# Patient Record
Sex: MALE | Race: WHITE | Employment: FULL TIME | URBAN - METROPOLITAN AREA
[De-identification: names, ages, dates, MRNs, and addresses within clinical notes are randomized per-mention and may not be internally consistent; named-entity substitution may affect disease eponyms.]

---

## 2019-07-13 ENCOUNTER — TRANSFERRED RECORDS (OUTPATIENT)
Dept: HEALTH INFORMATION MANAGEMENT | Facility: CLINIC | Age: 29
End: 2019-07-13

## 2019-07-14 LAB — HBA1C MFR BLD: 5.5 % (ref 4.8–5.6)

## 2019-07-15 ENCOUNTER — APPOINTMENT (OUTPATIENT)
Dept: GENERAL RADIOLOGY | Facility: CLINIC | Age: 29
End: 2019-07-15
Attending: STUDENT IN AN ORGANIZED HEALTH CARE EDUCATION/TRAINING PROGRAM
Payer: COMMERCIAL

## 2019-07-15 ENCOUNTER — HOSPITAL ENCOUNTER (INPATIENT)
Facility: CLINIC | Age: 29
LOS: 3 days | Discharge: HOME | End: 2019-07-18
Attending: INTERNAL MEDICINE | Admitting: PEDIATRICS
Payer: COMMERCIAL

## 2019-07-15 ENCOUNTER — TRANSFERRED RECORDS (OUTPATIENT)
Dept: HEALTH INFORMATION MANAGEMENT | Facility: CLINIC | Age: 29
End: 2019-07-15

## 2019-07-15 DIAGNOSIS — F33.2 SEVERE EPISODE OF RECURRENT MAJOR DEPRESSIVE DISORDER, WITHOUT PSYCHOTIC FEATURES (H): ICD-10-CM

## 2019-07-15 DIAGNOSIS — T39.1X2A TYLENOL OVERDOSE, INTENTIONAL SELF-HARM, INITIAL ENCOUNTER (H): Primary | ICD-10-CM

## 2019-07-15 LAB
ALBUMIN SERPL-MCNC: 3 G/DL (ref 3.4–5)
ALP SERPL-CCNC: 75 U/L (ref 40–150)
ALT SERPL W P-5'-P-CCNC: 4879 U/L (ref 0–70)
ALT SERPL-CCNC: >3500 U/L (ref 14–63)
AMMONIA PLAS-SCNC: 57 UMOL/L (ref 10–50)
ANION GAP SERPL CALCULATED.3IONS-SCNC: 8 MMOL/L (ref 3–14)
AST SERPL W P-5'-P-CCNC: 2441 U/L (ref 0–45)
AST SERPL-CCNC: >2000 U/L (ref 15–37)
BASOPHILS # BLD AUTO: 0 10E9/L (ref 0–0.2)
BASOPHILS NFR BLD AUTO: 0.6 %
BILIRUB DIRECT SERPL-MCNC: 1.4 MG/DL (ref 0–0.2)
BILIRUB SERPL-MCNC: 4.2 MG/DL (ref 0.2–1.3)
BUN SERPL-MCNC: 9 MG/DL (ref 7–30)
CALCIUM SERPL-MCNC: 7.7 MG/DL (ref 8.5–10.1)
CHLORIDE SERPL-SCNC: 104 MMOL/L (ref 94–109)
CO2 SERPL-SCNC: 26 MMOL/L (ref 20–32)
CREAT SERPL-MCNC: 0.78 MG/DL (ref 0.66–1.25)
CREAT SERPL-MCNC: 0.83 MG/DL (ref 0.67–1.17)
DIFFERENTIAL METHOD BLD: ABNORMAL
EOSINOPHIL # BLD AUTO: 0.1 10E9/L (ref 0–0.7)
EOSINOPHIL NFR BLD AUTO: 1.3 %
ERYTHROCYTE [DISTWIDTH] IN BLOOD BY AUTOMATED COUNT: 13.1 % (ref 10–15)
GFR SERPL CREATININE-BSD FRML MDRD: >60 ML/MIN/1.73M2 (ref 60–150)
GFR SERPL CREATININE-BSD FRML MDRD: >90 ML/MIN/{1.73_M2}
GLUCOSE SERPL-MCNC: 88 MG/DL (ref 70–99)
GLUCOSE SERPL-MCNC: 95 MG/DL (ref 74–100)
HCT VFR BLD AUTO: 46.9 % (ref 40–53)
HGB BLD-MCNC: 14.9 G/DL (ref 13.3–17.7)
IMM GRANULOCYTES # BLD: 0 10E9/L (ref 0–0.4)
IMM GRANULOCYTES NFR BLD: 0.2 %
INR PPP: 2.23 (ref 0.86–1.14)
INR PPP: 2.8 (ref 0.9–1.1)
LACTATE BLD-SCNC: 1.7 MMOL/L (ref 0.7–2)
LYMPHOCYTES # BLD AUTO: 0.7 10E9/L (ref 0.8–5.3)
LYMPHOCYTES NFR BLD AUTO: 14 %
MCH RBC QN AUTO: 28.9 PG (ref 26.5–33)
MCHC RBC AUTO-ENTMCNC: 31.8 G/DL (ref 31.5–36.5)
MCV RBC AUTO: 91 FL (ref 78–100)
MONOCYTES # BLD AUTO: 0.3 10E9/L (ref 0–1.3)
MONOCYTES NFR BLD AUTO: 5.4 %
NEUTROPHILS # BLD AUTO: 3.8 10E9/L (ref 1.6–8.3)
NEUTROPHILS NFR BLD AUTO: 78.5 %
NRBC # BLD AUTO: 0 10*3/UL
NRBC BLD AUTO-RTO: 0 /100
PHOSPHATE SERPL-MCNC: 1.3 MG/DL (ref 2.5–4.5)
PLATELET # BLD AUTO: 99 10E9/L (ref 150–450)
POTASSIUM SERPL-SCNC: 3.5 MMOL/L (ref 3.4–5.3)
POTASSIUM SERPL-SCNC: 3.6 MMOL/L (ref 3.5–5.1)
PROT SERPL-MCNC: 5.8 G/DL (ref 6.8–8.8)
RBC # BLD AUTO: 5.15 10E12/L (ref 4.4–5.9)
SODIUM SERPL-SCNC: 138 MMOL/L (ref 133–144)
WBC # BLD AUTO: 4.8 10E9/L (ref 4–11)

## 2019-07-15 PROCEDURE — 80076 HEPATIC FUNCTION PANEL: CPT | Performed by: STUDENT IN AN ORGANIZED HEALTH CARE EDUCATION/TRAINING PROGRAM

## 2019-07-15 PROCEDURE — 87077 CULTURE AEROBIC IDENTIFY: CPT | Performed by: STUDENT IN AN ORGANIZED HEALTH CARE EDUCATION/TRAINING PROGRAM

## 2019-07-15 PROCEDURE — 85025 COMPLETE CBC W/AUTO DIFF WBC: CPT | Performed by: STUDENT IN AN ORGANIZED HEALTH CARE EDUCATION/TRAINING PROGRAM

## 2019-07-15 PROCEDURE — 40000556 ZZH STATISTIC PERIPHERAL IV START W US GUIDANCE

## 2019-07-15 PROCEDURE — 87800 DETECT AGNT MULT DNA DIREC: CPT | Performed by: STUDENT IN AN ORGANIZED HEALTH CARE EDUCATION/TRAINING PROGRAM

## 2019-07-15 PROCEDURE — 85610 PROTHROMBIN TIME: CPT | Performed by: STUDENT IN AN ORGANIZED HEALTH CARE EDUCATION/TRAINING PROGRAM

## 2019-07-15 PROCEDURE — 87040 BLOOD CULTURE FOR BACTERIA: CPT | Performed by: STUDENT IN AN ORGANIZED HEALTH CARE EDUCATION/TRAINING PROGRAM

## 2019-07-15 PROCEDURE — 87186 SC STD MICRODIL/AGAR DIL: CPT | Performed by: STUDENT IN AN ORGANIZED HEALTH CARE EDUCATION/TRAINING PROGRAM

## 2019-07-15 PROCEDURE — 25800030 ZZH RX IP 258 OP 636: Performed by: STUDENT IN AN ORGANIZED HEALTH CARE EDUCATION/TRAINING PROGRAM

## 2019-07-15 PROCEDURE — 99223 1ST HOSP IP/OBS HIGH 75: CPT | Mod: AI | Performed by: PEDIATRICS

## 2019-07-15 PROCEDURE — 81001 URINALYSIS AUTO W/SCOPE: CPT | Performed by: STUDENT IN AN ORGANIZED HEALTH CARE EDUCATION/TRAINING PROGRAM

## 2019-07-15 PROCEDURE — 82140 ASSAY OF AMMONIA: CPT | Performed by: STUDENT IN AN ORGANIZED HEALTH CARE EDUCATION/TRAINING PROGRAM

## 2019-07-15 PROCEDURE — 36415 COLL VENOUS BLD VENIPUNCTURE: CPT | Performed by: STUDENT IN AN ORGANIZED HEALTH CARE EDUCATION/TRAINING PROGRAM

## 2019-07-15 PROCEDURE — 80048 BASIC METABOLIC PNL TOTAL CA: CPT | Performed by: STUDENT IN AN ORGANIZED HEALTH CARE EDUCATION/TRAINING PROGRAM

## 2019-07-15 PROCEDURE — 83605 ASSAY OF LACTIC ACID: CPT | Performed by: STUDENT IN AN ORGANIZED HEALTH CARE EDUCATION/TRAINING PROGRAM

## 2019-07-15 PROCEDURE — 25000128 H RX IP 250 OP 636: Performed by: STUDENT IN AN ORGANIZED HEALTH CARE EDUCATION/TRAINING PROGRAM

## 2019-07-15 PROCEDURE — 71045 X-RAY EXAM CHEST 1 VIEW: CPT

## 2019-07-15 PROCEDURE — 84100 ASSAY OF PHOSPHORUS: CPT | Performed by: STUDENT IN AN ORGANIZED HEALTH CARE EDUCATION/TRAINING PROGRAM

## 2019-07-15 PROCEDURE — 12000004 ZZH R&B IMCU UMMC

## 2019-07-15 RX ORDER — LIDOCAINE 40 MG/G
CREAM TOPICAL
Status: DISCONTINUED | OUTPATIENT
Start: 2019-07-15 | End: 2019-07-18 | Stop reason: HOSPADM

## 2019-07-15 RX ORDER — ONDANSETRON 4 MG/1
4 TABLET, ORALLY DISINTEGRATING ORAL EVERY 6 HOURS PRN
Status: DISCONTINUED | OUTPATIENT
Start: 2019-07-15 | End: 2019-07-18 | Stop reason: HOSPADM

## 2019-07-15 RX ORDER — ONDANSETRON 2 MG/ML
4 INJECTION INTRAMUSCULAR; INTRAVENOUS EVERY 6 HOURS PRN
Status: DISCONTINUED | OUTPATIENT
Start: 2019-07-15 | End: 2019-07-18 | Stop reason: HOSPADM

## 2019-07-15 RX ORDER — PANTOPRAZOLE SODIUM 40 MG/1
40 TABLET, DELAYED RELEASE ORAL
Status: DISCONTINUED | OUTPATIENT
Start: 2019-07-16 | End: 2019-07-18 | Stop reason: HOSPADM

## 2019-07-15 RX ORDER — NALOXONE HYDROCHLORIDE 0.4 MG/ML
.1-.4 INJECTION, SOLUTION INTRAMUSCULAR; INTRAVENOUS; SUBCUTANEOUS
Status: DISCONTINUED | OUTPATIENT
Start: 2019-07-15 | End: 2019-07-18 | Stop reason: HOSPADM

## 2019-07-15 RX ADMIN — ACETYLCYSTEINE 6.25 MG/KG/HR: 200 INJECTION, SOLUTION INTRAVENOUS at 20:23

## 2019-07-15 ASSESSMENT — ACTIVITIES OF DAILY LIVING (ADL)
BATHING: 1-->ASSISTIVE EQUIPMENT
RETIRED_EATING: 0-->INDEPENDENT
RETIRED_COMMUNICATION: 0-->UNDERSTANDS/COMMUNICATES WITHOUT DIFFICULTY
COGNITION: 0 - NO COGNITION ISSUES REPORTED
TRANSFERRING: 0-->INDEPENDENT
TOILETING: 0-->INDEPENDENT
SWALLOWING: 0-->SWALLOWS FOODS/LIQUIDS WITHOUT DIFFICULTY
ADLS_ACUITY_SCORE: 19
FALL_HISTORY_WITHIN_LAST_SIX_MONTHS: NO
DRESS: 0-->INDEPENDENT
WHICH_OF_THE_ABOVE_FUNCTIONAL_RISKS_HAD_A_RECENT_ONSET_OR_CHANGE?: AMBULATION
AMBULATION: 1-->ASSISTIVE EQUIPMENT

## 2019-07-15 ASSESSMENT — MIFFLIN-ST. JEOR: SCORE: 2725.34

## 2019-07-15 NOTE — H&P
[facility]    History and Physical - [facility] Night Service        Date of Admission:  7/15/2019    Assessment & Plan   Tree Andersen is a 28 year old male admitted on 7/15/2019. He has a past medical history significant for major depressive disorder presenting here as a transfer from [facility] for further evaluation and management of an acetaminophen overdose.     #Acetaminophen overdose  #Acute liver injury  Took approximately 60 tablets of acetaminophen around 0300 on 7/13. Upon presentation at OSH: , , INR 1.2, acetaminophen level 50. Began mucomyst protocol and received 5mg IV vitamin K. ALT increased to >3500, AST to >2000, INR to 2.8, acetaminophen level <2 after 24 hours. Admission labs here at [facility] notable for elevated ammonia to 57, ALT 4879, AST 2441. INR 2.23, platelets 99. Lactic acid wnl at 1.7. Phos low at 1.3. Patient not encephalopathic at the time of admission here.  - Acetaminophen level upon presentation at OSH: 50 (7/13 at 1650)  - Hepatology consult, appreciate reccs  - Continue NAC protocol  - Q2H neuro-checks  - Q8H Labs: CMP, INR, ammonia, phosphorus  - Pantoprazole 40mg PO Qday (ulcer ppx)  - CXR, abd U/S w/dopplers, blood culture x2, UA/UC, pending  - Elevate head of bed to 30 degrees  - If change in mental status, stat CTH and contact GI  - Discussed case with [place] Poison Control, recommend to continue NAC until AST<1000, INR<2   - They will reach out in AM for updates and any further reccs    #MDD  #C/f possible suicide attempt  #Hx of suicidal ideation  Diagnosed with MDD one year ago. Prescribed wellbutrin approximately 3 months ago, but had stopped in the last few weeks (ran out of pills). Acetaminophen ingestion yesterday morning was not pre-planned, and per patient he did not intend to take his life. He has had suicide ideation in the past, but without plans/intent to act.  - Hold PTA wellbutrin for now (from Uptodate,  wellbutrin should be used with caution in setting of hepatic impairment)  - Will consider psychiatry consult while inpatient if patient is willing/interested     Diet: Regular Diet  Fluids: prn  DVT Prophylaxis: mechanical  Johnston Catheter: not present  Code Status: Full    Disposition Plan   Expected discharge: > 7 days, recommended to prior living arrangement once acetominophen toxicity treated, clinically stable, tolerating PO.  Entered: Jean Oliveria MD 07/15/2019, 6:46 PM     The patient's care was discussed with the Attending Physician, Dr. Rutherford.    Jean Oliveira MD  PGY1, Medicine-Pediatrics  [facility]  Please see sticky note for cross cover information  ______________________________________________________________________    Chief Complaint   acetaminophen overdose    History is obtained from the patient    History of Present Illness   Tree Andersen is a 28 year old male with past medical history significant for MDD who presents as a transfer from [facility] where he had been seen following an acetaminophen overdose. The patient reports consuming approximately 60 500mg tablets of acetaminophen around 3AM on Saturday morning 7/13. Prior to this, he had also consumed approximately five ~8oz glasses of vodka over the course of the evening and early morning. The patient said he was upset with himself after consuming the pills, and decided at that point to go to sleep. He awoke later that morning feeling nauseous and vomited for approximately two hours. He does not recall what the vomit looked like, but does not believe it contained blood. He also had diarrhea, which was not dark nor did it contain blood. He developed diffuse abdominal pain, which was worst in his right upper quadrant and in near his xiphoid process. The pain is worse with movement, and improves with rest. At this time, the pain is not present unless he  "moves. He has a dull headache that began yesterday but has improved throughout today. He denies feeling confused. He does feel fatigued. He denies having any fever or chills, chest pain, cough, or shortness of breath. He has not noticed any skin changes (yellow, bruising, or bleeding). He has not noticed any swelling in his legs.    The patient does not report any new, specific life stressors prior to the ingestion episode. He mentions that he was feeling down for a couple of days beforehand; he acknowledges that his mood fluctuates and he often experiences days at a time when he feels more depressed. He had recently (one year ago) been diagnosed with depression, and had started wellbutrin within the past few months. He acknowledges that the medication may have helped, but he had stopped taking it a few weeks ago after running out. Of note, he has been on medical leave from work for the past 3 months due to depression. Prior to the ingestion episode, he had been drinking vodka and playing video games. He recalls seeing the bottle of acetaminophen and impulsively decided to take the pills. He had not been planning to harm himself, and immediately felt regret after taking the pills. He expressed that he is \"very upset\" with himself and has many reasons to live, namely his seven year old son. He does note that he has had suicidal ideation in the past, but never with any intent nor plan to act. At present he continues to express not having any intention of harming himself, nor of harming others.     Upon presentation at OSH, he was found to have , , INR 1.2. He received mucomyst protocol and 5mg IV vitamin K. Over the course of 24 hours, acetaminophen level fell to <2, ALT had increased to >3500, AST to >2000, INR to 2.8. Admission labs here at [facility] notable for elevated ammonia to 57, ALT 4879, AST 2441. INR 2.23, platelets 99. Lactic acid wnl at 1.7. Phos low at 1.3. Patient was hemodynamically stable at " time of admission.    Review of Systems    The 10 point Review of Systems is negative other than noted in the HPI.    Past Medical History    MDD    Past Surgical History   Tonsillectomy    Social History     The patient lives with his mother in [place], [place]. He was employed by [employer] in a [employer] center until a few months ago, when he took medical leave for depression. He has one son, age 7. He drinks alcohol occasionally (3-4 times per year), does not use tobacco products or other recreational drugs.    Family History   MDD - mother, father  PTSD - mother    Prior to Admission Medications   Wellbutrin    Allergies   Allergies not on file    Physical Exam   Vital Signs: Temp: 98.9  F (37.2  C) Temp src: Oral       Resp: 18 SpO2: 96 % O2 Device: None (Room air)      General: sitting upright in bed w/ mother at bedside, he is occasional tearful, otherwise NAD, soft spoken, cooperative with exam  HEENT: AT/NT, EOMI, MMM, no scleral icterus, conjunctival hemorrhage bilaterally  Cardiovascular: RRR, normal S1/S2, no S3/S4, no murmurs  Pulm: CTAB, normal effort, no wheezes or crackles  Abdomen: soft, obese, non-tender except minimally tender in RUQ, non-distended, +BS, no hepatosplenomegaly   Extremities: no edema bilaterally  Skin: warm, no rashes, bruises or jaundice  Neuro: A&Ox3, CNII-XII grossly intact, moves all extremities, no focal deficits, no asterixis, sensation intact  Psych: diminished affect    Data   Data reviewed today: I reviewed all medications, new labs and imaging results over the last 24 hours.    Basic metabolic panel   Result Value Ref Range    Sodium 138 133 - 144 mmol/L    Potassium 3.5 3.4 - 5.3 mmol/L    Chloride 104 94 - 109 mmol/L    Carbon Dioxide 26 20 - 32 mmol/L    Anion Gap 8 3 - 14 mmol/L    Glucose 88 70 - 99 mg/dL    Urea Nitrogen 9 7 - 30 mg/dL    Creatinine 0.78 0.66 - 1.25 mg/dL    GFR Estimate >90 >60 mL/min/[1.73_m2]    GFR Estimate If Black >90 >60 mL/min/[1.73_m2]     Calcium 7.7 (L) 8.5 - 10.1 mg/dL   Hepatic panel   Result Value Ref Range    Bilirubin Direct 1.4 (H) 0.0 - 0.2 mg/dL    Bilirubin Total 4.2 (H) 0.2 - 1.3 mg/dL    Albumin 3.0 (L) 3.4 - 5.0 g/dL    Protein Total 5.8 (L) 6.8 - 8.8 g/dL    Alkaline Phosphatase 75 40 - 150 U/L    ALT PENDING 0 - 70 U/L    AST PENDING 0 - 45 U/L   CBC with platelets differential   Result Value Ref Range    WBC 4.8 4.0 - 11.0 10e9/L    RBC Count 5.15 4.4 - 5.9 10e12/L    Hemoglobin 14.9 13.3 - 17.7 g/dL    Hematocrit 46.9 40.0 - 53.0 %    MCV 91 78 - 100 fl    MCH 28.9 26.5 - 33.0 pg    MCHC 31.8 31.5 - 36.5 g/dL    RDW 13.1 10.0 - 15.0 %    Platelet Count 99 (L) 150 - 450 10e9/L    Diff Method Automated Method     % Neutrophils 78.5 %    % Lymphocytes 14.0 %    % Monocytes 5.4 %    % Eosinophils 1.3 %    % Basophils 0.6 %    % Immature Granulocytes 0.2 %    Nucleated RBCs 0 0 /100    Absolute Neutrophil 3.8 1.6 - 8.3 10e9/L    Absolute Lymphocytes 0.7 (L) 0.8 - 5.3 10e9/L    Absolute Monocytes 0.3 0.0 - 1.3 10e9/L    Absolute Eosinophils 0.1 0.0 - 0.7 10e9/L    Absolute Basophils 0.0 0.0 - 0.2 10e9/L    Abs Immature Granulocytes 0.0 0 - 0.4 10e9/L    Absolute Nucleated RBC 0.0    INR   Result Value Ref Range    INR 2.23 (H) 0.86 - 1.14   Lactic acid whole blood   Result Value Ref Range    Lactic Acid 1.7 0.7 - 2.0 mmol/L   Ammonia   Result Value Ref Range    Ammonia 57 (H) 10 - 50 umol/L   Phosphorus   Result Value Ref Range    Phosphorus 1.3 (L) 2.5 - 4.5 mg/dL   XR Chest Port 1 View    Narrative    EXAM: XR CHEST PORT 1 VW  7/15/2019 8:27 PM      HISTORY: evaluate for any new pulmonary infiltrate    COMPARISON: None.    FINDINGS: Semiupright AP radiograph of the chest. Lungs are  hypoinflated. The trachea is midline. The cardiac silhouette is within  normal limits. No pleural effusion or pneumothorax. No focal airspace  opacities. Upper abdomen is unremarkable.       Impression    IMPRESSION: No focal airspace opacities. Lungs are  hypoinflated.    I have personally reviewed the examination and initial interpretation  and I agree with the findings.    MACARENA REILLY MD

## 2019-07-15 NOTE — CONSULTS
GASTROENTEROLOGY CONSULTATION      Date of Admission:  (Not on file)          ASSESSMENT AND RECOMMENDATIONS:   Assessment:  Tree Andersen is a 28 year old male with a past medical history of depression presenting as transfer after presentation to OSH following acetaminophen and alcohol overdose, has had progressive elevation in transaminases and INR prompting transfer to [facility] for further evaluation/management.     1. Acute Liver Injury  2. Acetaminophen and Alcohol Overdose  3. Depression  Etiology: Acetaminophen and alcohol. Elevated LFTs and INR. Does not meet acute liver failure criteria currently given lack of encephalopathy. Recommend ongoing supportive care, NAC administration and following clinically. If patient were to develop acute liver failure, no immediate contraindications to listing for liver transplant identified on initial evaluation.      Recommendations:   - Continue NAC   - q2h neuro checks   - CMP, INR, ammonia, phosphorus q8h   - Regular diet   - PPI 40 mg PO every day for stress ulcer prophylaxis   - U/S Abdomen with dopplers   - CXR, blood cultures x2, UA/UCx   - Head of bed elevation to 30*   - If any changes in mental status, obtain stat CT Head and please page GI fellow on call   - Gastroenterology team will continue to follow with you    Gastroenterology follow up recommendations: Pending clinical course.     Thank you for involving us in this patient's care. Please do not hesitate to contact the GI service with any questions or concerns.     Pt care plan discussed with Dr. Leventhal, GI staff physician.    Autumn Agosto MD  Gastroenterology/Hepatology Fellow  PGY-6, [id number]  -------------------------------------------------------------------------------------------------------------------          Chief Complaint:   We were asked by Dr. La of Internal Medicine to evaluate this patient with ALI    History is obtained from the patient and the medical record.          History of  Present Illness:   Tree Andersen is a 28 year old male with a past medical history of depression presenting as transfer after presentation to OSH following acetaminophen and alcohol overdose, has had progressive elevation in transaminases and INR prompting transfer to [facility] for further evaluation/management.     Patient reports drinking five glasses of vodka (at least 3 shots per glass) and taking 60 tablets of 325 mg acetaminophen early Saturday morning. He reports he was not trying to harm himself, although he knew it would. He was also not trying to commit suicide. He went to bed and woke up with intractable vomiting and feeling guilty, prompting his presentation to the emergency department. He had some diarrhea, no melena or hematochezia, associated with the vomiting and right upper quadrant abdominal pain radiating to his chest, worse with movement (6/10) and 3/10 severity while not moving. He is not currently having this pain. He denies fevers, chills, chest pain, shortness of breath, confusion, swelling of his legs, yellowing of skin or eyes, pruritis. He has had a headache for the past two days and sensitivity to light.     He reports a many year history of depression, however, just was diagnosed one year ago and started on Wellbutrin three months ago. He has not had any prior suicide attempts. He has had some ideation recently, however, never had a plan to intent to act on a suicide attempt.     At the OSH, he was found to have stable vital signs with sinus tachycardia, AP 71, ,  and INR 1.2. A drug screen was positive for benzodiazepines. His AST up-trended to >2000, ALT >3500, and INR to 2.8 over the past two days prompting transfer to [facility].             Past Medical History:   Reviewed and edited as appropriate  No past medical history on file.   Depression         Past Surgical History:   Reviewed and edited as appropriate   No past surgical history on file.   None         Previous Endoscopy:    No results found for this or any previous visit.         Social History:   Reviewed and edited as appropriate  Social History     Socioeconomic History     Marital status: Not on file     Spouse name: Not on file     Number of children: Not on file     Years of education: Not on file     Highest education level: Not on file   Occupational History     Not on file   Social Needs     Financial resource strain: Not on file     Food insecurity:     Worry: Not on file     Inability: Not on file     Transportation needs:     Medical: Not on file     Non-medical: Not on file   Tobacco Use     Smoking status: Not on file   Substance and Sexual Activity     Alcohol use: Not on file     Drug use: Not on file     Sexual activity: Not on file   Lifestyle     Physical activity:     Days per week: Not on file     Minutes per session: Not on file     Stress: Not on file   Relationships     Social connections:     Talks on phone: Not on file     Gets together: Not on file     Attends Rastafarian service: Not on file     Active member of club or organization: Not on file     Attends meetings of clubs or organizations: Not on file     Relationship status: Not on file     Intimate partner violence:     Fear of current or ex partner: Not on file     Emotionally abused: Not on file     Physically abused: Not on file     Forced sexual activity: Not on file   Other Topics Concern     Not on file   Social History Narrative     Not on file   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 Drinks 3-4 times per year, no more than $30 bar tab per time. Prior marijuana, no prior cocaine or IVDA. Admitted previously with alcohol intoxication after drinking too much at the bar 2-3 years ago. Lives with mom. Has one son. No prior DUI/DWI.          Family History:   Reviewed and edited as appropriate  No family history on file.   No family history of liver disease.          Allergies:   Reviewed and edited as appropriate   Allergies not on  "file   No known drug allergies.          Medications:     No medications prior to admission.    Wellbutrin         Review of Systems:     A complete review of systems was performed and is negative except as noted in the HPI           Physical Exam:   There were no vitals taken for this visit.  Wt:   Wt Readings from Last 2 Encounters:   No data found for Wt    /79   Pulse 104   Temp 98.9  F (37.2  C) (Oral)   Resp 18   Ht 1.88 m (6' 2\")   Wt (!) 167.6 kg (369 lb 6.4 oz)   SpO2 96%   BMI 47.43 kg/m      Constitutional: cooperative, pleasant, not dyspneic/diaphoretic, no acute distress  Eyes: Sclera with conjunctival hemorrhage; pupils equal and reactive to light bilaterally   Ears/nose/mouth/throat: Normal oropharynx without ulcers or exudate, mucus membranes moist, hearing intact  Neck: supple, thyroid normal size  CV: No edema  Respiratory: Unlabored breathing  Lymph: No axillary, submandibular, supraclavicular lymphadenopathy  Abd: Obese, nondistended, +bs, no hepatosplenomegaly, nontender, no peritoneal signs  Skin: warm, perfused, no jaundice  Neuro: AAO x 3, No asterixis  Psych: Normal affect  MSK: Normal gait         Data:   Labs and imaging below were independently reviewed and interpreted    BMPNo lab results found in last 7 days.  CBCNo lab results found in last 7 days.  INRNo lab results found in last 7 days.  LFTsNo lab results found in last 7 days.   PANCNo lab results found in last 7 days.    Imaging: Reviewed.     "

## 2019-07-15 NOTE — PROGRESS NOTES
[facility]  Transfer Triage Note    Date of call: 07/15/19  Time of call: 2:32 PM    Reason for Transfer:Further diagnostic work up, management, and consultation for specialized care  Diagnosis: concern for fulminant hepatic failure     Outside Records: Available  Additional records requested to be faxed to [phone].    Stability of Patient: Patient is at risk for instability, however patient requires urgent transfer and does not meet ICU criteria     Expected Time of Arrival for Transfer: 0-8 hours    Recommendations for Management and Stabilization: Given    Additional Comments   27 y/o M with PMH of chronic depression on wellbutrin but has not been taking it for past few days presented 2 days back with tylenol overdose -took 60 tablets and vodka along with it. Presented 12 hours out from ingestion and likely was suicidal attempt. On presentation AST- 184, ALT-140s, INR =1.2; started mucomyst protocol and got 5 mg IV vitamin K. ALT to 3500, EVT=8696 and INR to 2.5 today but tylenol less than 2. Patient has mild confusion but normal ammonia level. GI following there and pt has been kept on mucomyst protocol. GI felt pt will be better served in transplant center due to worsening LFTs. This case was discussed with Dr Leventhal from GI and agreed that pt should be transferred here  Recommendations;   -q2hr finger stick ; q2hr neurochecks and phos check   -hepatology to be consulted when patient arrives.   -step down transfer       Melisa La MD

## 2019-07-16 ENCOUNTER — APPOINTMENT (OUTPATIENT)
Dept: PHYSICAL THERAPY | Facility: CLINIC | Age: 29
End: 2019-07-16
Attending: STUDENT IN AN ORGANIZED HEALTH CARE EDUCATION/TRAINING PROGRAM
Payer: COMMERCIAL

## 2019-07-16 ENCOUNTER — APPOINTMENT (OUTPATIENT)
Dept: ULTRASOUND IMAGING | Facility: CLINIC | Age: 29
End: 2019-07-16
Attending: STUDENT IN AN ORGANIZED HEALTH CARE EDUCATION/TRAINING PROGRAM
Payer: COMMERCIAL

## 2019-07-16 LAB
ALBUMIN SERPL-MCNC: 2.8 G/DL (ref 3.4–5)
ALBUMIN SERPL-MCNC: 3 G/DL (ref 3.4–5)
ALBUMIN SERPL-MCNC: 3 G/DL (ref 3.4–5)
ALBUMIN UR-MCNC: 10 MG/DL
ALP SERPL-CCNC: 74 U/L (ref 40–150)
ALT SERPL W P-5'-P-CCNC: 2899 U/L (ref 0–70)
ALT SERPL W P-5'-P-CCNC: 3350 U/L (ref 0–70)
ALT SERPL W P-5'-P-CCNC: 3614 U/L (ref 0–70)
AMMONIA PLAS-SCNC: 45 UMOL/L (ref 10–50)
AMMONIA PLAS-SCNC: 54 UMOL/L (ref 10–50)
AMMONIA PLAS-SCNC: 57 UMOL/L (ref 10–50)
ANION GAP SERPL CALCULATED.3IONS-SCNC: 8 MMOL/L (ref 3–14)
ANION GAP SERPL CALCULATED.3IONS-SCNC: 8 MMOL/L (ref 3–14)
ANION GAP SERPL CALCULATED.3IONS-SCNC: 9 MMOL/L (ref 3–14)
APPEARANCE UR: CLEAR
AST SERPL W P-5'-P-CCNC: 1053 U/L (ref 0–45)
AST SERPL W P-5'-P-CCNC: 1377 U/L (ref 0–45)
AST SERPL W P-5'-P-CCNC: 760 U/L (ref 0–45)
BASE EXCESS BLDA CALC-SCNC: 4.2 MMOL/L
BILIRUB SERPL-MCNC: 2.6 MG/DL (ref 0.2–1.3)
BILIRUB SERPL-MCNC: 3.4 MG/DL (ref 0.2–1.3)
BILIRUB SERPL-MCNC: 3.7 MG/DL (ref 0.2–1.3)
BILIRUB UR QL STRIP: NEGATIVE
BUN SERPL-MCNC: 7 MG/DL (ref 7–30)
BUN SERPL-MCNC: 7 MG/DL (ref 7–30)
BUN SERPL-MCNC: 8 MG/DL (ref 7–30)
CALCIUM SERPL-MCNC: 7.8 MG/DL (ref 8.5–10.1)
CALCIUM SERPL-MCNC: 7.9 MG/DL (ref 8.5–10.1)
CALCIUM SERPL-MCNC: 8.1 MG/DL (ref 8.5–10.1)
CHLORIDE SERPL-SCNC: 105 MMOL/L (ref 94–109)
CHLORIDE SERPL-SCNC: 106 MMOL/L (ref 94–109)
CHLORIDE SERPL-SCNC: 106 MMOL/L (ref 94–109)
CO2 SERPL-SCNC: 25 MMOL/L (ref 20–32)
CO2 SERPL-SCNC: 25 MMOL/L (ref 20–32)
CO2 SERPL-SCNC: 26 MMOL/L (ref 20–32)
COLOR UR AUTO: ABNORMAL
CREAT SERPL-MCNC: 0.78 MG/DL (ref 0.66–1.25)
CREAT SERPL-MCNC: 0.84 MG/DL (ref 0.66–1.25)
CREAT SERPL-MCNC: 0.84 MG/DL (ref 0.66–1.25)
ERYTHROCYTE [DISTWIDTH] IN BLOOD BY AUTOMATED COUNT: 12.9 % (ref 10–15)
GFR SERPL CREATININE-BSD FRML MDRD: >90 ML/MIN/{1.73_M2}
GLUCOSE SERPL-MCNC: 82 MG/DL (ref 70–99)
GLUCOSE SERPL-MCNC: 93 MG/DL (ref 70–99)
GLUCOSE SERPL-MCNC: 94 MG/DL (ref 70–99)
GLUCOSE UR STRIP-MCNC: 70 MG/DL
HCO3 BLD-SCNC: 28 MMOL/L (ref 21–28)
HCT VFR BLD AUTO: 45.6 % (ref 40–53)
HGB BLD-MCNC: 14.2 G/DL (ref 13.3–17.7)
HGB UR QL STRIP: ABNORMAL
INR PPP: 1.53 (ref 0.86–1.14)
INR PPP: 1.74 (ref 0.86–1.14)
INR PPP: 1.88 (ref 0.86–1.14)
KETONES UR STRIP-MCNC: 10 MG/DL
LACTATE BLD-SCNC: 1.3 MMOL/L (ref 0.7–2)
LEUKOCYTE ESTERASE UR QL STRIP: NEGATIVE
MAGNESIUM SERPL-MCNC: 1.8 MG/DL (ref 1.6–2.3)
MAGNESIUM SERPL-MCNC: 1.9 MG/DL (ref 1.6–2.3)
MAGNESIUM SERPL-MCNC: 2.1 MG/DL (ref 1.6–2.3)
MCH RBC QN AUTO: 28.2 PG (ref 26.5–33)
MCHC RBC AUTO-ENTMCNC: 31.1 G/DL (ref 31.5–36.5)
MCV RBC AUTO: 91 FL (ref 78–100)
MUCOUS THREADS #/AREA URNS LPF: PRESENT /LPF
NITRATE UR QL: NEGATIVE
O2/TOTAL GAS SETTING VFR VENT: 21 %
PCO2 BLD: 40 MM HG (ref 35–45)
PH BLD: 7.46 PH (ref 7.35–7.45)
PH UR STRIP: 5.5 PH (ref 5–7)
PHOSPHATE SERPL-MCNC: 1.6 MG/DL (ref 2.5–4.5)
PHOSPHATE SERPL-MCNC: 1.6 MG/DL (ref 2.5–4.5)
PHOSPHATE SERPL-MCNC: 2.6 MG/DL (ref 2.5–4.5)
PLATELET # BLD AUTO: 101 10E9/L (ref 150–450)
PO2 BLD: 78 MM HG (ref 80–105)
POTASSIUM SERPL-SCNC: 3.5 MMOL/L (ref 3.4–5.3)
POTASSIUM SERPL-SCNC: 3.6 MMOL/L (ref 3.4–5.3)
POTASSIUM SERPL-SCNC: 3.6 MMOL/L (ref 3.4–5.3)
PROT SERPL-MCNC: 5.5 G/DL (ref 6.8–8.8)
PROT SERPL-MCNC: 5.8 G/DL (ref 6.8–8.8)
PROT SERPL-MCNC: 6 G/DL (ref 6.8–8.8)
RBC # BLD AUTO: 5.03 10E12/L (ref 4.4–5.9)
RBC #/AREA URNS AUTO: <1 /HPF (ref 0–2)
SODIUM SERPL-SCNC: 139 MMOL/L (ref 133–144)
SODIUM SERPL-SCNC: 140 MMOL/L (ref 133–144)
SODIUM SERPL-SCNC: 140 MMOL/L (ref 133–144)
SOURCE: ABNORMAL
SP GR UR STRIP: 1.03 (ref 1–1.03)
UROBILINOGEN UR STRIP-MCNC: NORMAL MG/DL (ref 0–2)
WBC # BLD AUTO: 4.2 10E9/L (ref 4–11)
WBC #/AREA URNS AUTO: 1 /HPF (ref 0–5)

## 2019-07-16 PROCEDURE — 83735 ASSAY OF MAGNESIUM: CPT | Performed by: STUDENT IN AN ORGANIZED HEALTH CARE EDUCATION/TRAINING PROGRAM

## 2019-07-16 PROCEDURE — 82140 ASSAY OF AMMONIA: CPT | Performed by: STUDENT IN AN ORGANIZED HEALTH CARE EDUCATION/TRAINING PROGRAM

## 2019-07-16 PROCEDURE — 84100 ASSAY OF PHOSPHORUS: CPT | Performed by: STUDENT IN AN ORGANIZED HEALTH CARE EDUCATION/TRAINING PROGRAM

## 2019-07-16 PROCEDURE — 80053 COMPREHEN METABOLIC PANEL: CPT | Performed by: STUDENT IN AN ORGANIZED HEALTH CARE EDUCATION/TRAINING PROGRAM

## 2019-07-16 PROCEDURE — 12000012 ZZH R&B MS OVERFLOW UMMC

## 2019-07-16 PROCEDURE — 85027 COMPLETE CBC AUTOMATED: CPT | Performed by: PEDIATRICS

## 2019-07-16 PROCEDURE — 25000125 ZZHC RX 250: Performed by: STUDENT IN AN ORGANIZED HEALTH CARE EDUCATION/TRAINING PROGRAM

## 2019-07-16 PROCEDURE — 25000128 H RX IP 250 OP 636: Performed by: STUDENT IN AN ORGANIZED HEALTH CARE EDUCATION/TRAINING PROGRAM

## 2019-07-16 PROCEDURE — 25800030 ZZH RX IP 258 OP 636: Performed by: STUDENT IN AN ORGANIZED HEALTH CARE EDUCATION/TRAINING PROGRAM

## 2019-07-16 PROCEDURE — 97116 GAIT TRAINING THERAPY: CPT | Mod: GP | Performed by: REHABILITATION PRACTITIONER

## 2019-07-16 PROCEDURE — 80076 HEPATIC FUNCTION PANEL: CPT | Performed by: STUDENT IN AN ORGANIZED HEALTH CARE EDUCATION/TRAINING PROGRAM

## 2019-07-16 PROCEDURE — 83605 ASSAY OF LACTIC ACID: CPT | Performed by: STUDENT IN AN ORGANIZED HEALTH CARE EDUCATION/TRAINING PROGRAM

## 2019-07-16 PROCEDURE — 82803 BLOOD GASES ANY COMBINATION: CPT | Performed by: STUDENT IN AN ORGANIZED HEALTH CARE EDUCATION/TRAINING PROGRAM

## 2019-07-16 PROCEDURE — 40000141 ZZH STATISTIC PERIPHERAL IV START W/O US GUIDANCE

## 2019-07-16 PROCEDURE — 97162 PT EVAL MOD COMPLEX 30 MIN: CPT | Mod: GP | Performed by: REHABILITATION PRACTITIONER

## 2019-07-16 PROCEDURE — 99233 SBSQ HOSP IP/OBS HIGH 50: CPT | Mod: GC | Performed by: STUDENT IN AN ORGANIZED HEALTH CARE EDUCATION/TRAINING PROGRAM

## 2019-07-16 PROCEDURE — 85610 PROTHROMBIN TIME: CPT | Performed by: STUDENT IN AN ORGANIZED HEALTH CARE EDUCATION/TRAINING PROGRAM

## 2019-07-16 PROCEDURE — 76705 ECHO EXAM OF ABDOMEN: CPT

## 2019-07-16 PROCEDURE — 25000132 ZZH RX MED GY IP 250 OP 250 PS 637: Performed by: STUDENT IN AN ORGANIZED HEALTH CARE EDUCATION/TRAINING PROGRAM

## 2019-07-16 PROCEDURE — 99222 1ST HOSP IP/OBS MODERATE 55: CPT | Performed by: PSYCHIATRY & NEUROLOGY

## 2019-07-16 PROCEDURE — 36415 COLL VENOUS BLD VENIPUNCTURE: CPT | Performed by: STUDENT IN AN ORGANIZED HEALTH CARE EDUCATION/TRAINING PROGRAM

## 2019-07-16 PROCEDURE — 36600 WITHDRAWAL OF ARTERIAL BLOOD: CPT

## 2019-07-16 PROCEDURE — 40000802 ZZH SITE CHECK

## 2019-07-16 RX ORDER — POTASSIUM CHLORIDE 1.5 G/1.58G
20-40 POWDER, FOR SOLUTION ORAL
Status: DISCONTINUED | OUTPATIENT
Start: 2019-07-16 | End: 2019-07-18 | Stop reason: HOSPADM

## 2019-07-16 RX ORDER — MAGNESIUM SULFATE HEPTAHYDRATE 40 MG/ML
4 INJECTION, SOLUTION INTRAVENOUS EVERY 4 HOURS PRN
Status: DISCONTINUED | OUTPATIENT
Start: 2019-07-16 | End: 2019-07-18 | Stop reason: HOSPADM

## 2019-07-16 RX ORDER — POTASSIUM CL/LIDO/0.9 % NACL 10MEQ/0.1L
10 INTRAVENOUS SOLUTION, PIGGYBACK (ML) INTRAVENOUS
Status: DISCONTINUED | OUTPATIENT
Start: 2019-07-16 | End: 2019-07-18 | Stop reason: HOSPADM

## 2019-07-16 RX ORDER — POTASSIUM CHLORIDE 29.8 MG/ML
20 INJECTION INTRAVENOUS
Status: DISCONTINUED | OUTPATIENT
Start: 2019-07-16 | End: 2019-07-18 | Stop reason: HOSPADM

## 2019-07-16 RX ORDER — POTASSIUM CHLORIDE 7.45 MG/ML
10 INJECTION INTRAVENOUS
Status: DISCONTINUED | OUTPATIENT
Start: 2019-07-16 | End: 2019-07-18 | Stop reason: HOSPADM

## 2019-07-16 RX ORDER — POTASSIUM CHLORIDE 750 MG/1
20-40 TABLET, EXTENDED RELEASE ORAL
Status: DISCONTINUED | OUTPATIENT
Start: 2019-07-16 | End: 2019-07-18 | Stop reason: HOSPADM

## 2019-07-16 RX ORDER — FUROSEMIDE 10 MG/ML
40 INJECTION INTRAMUSCULAR; INTRAVENOUS ONCE
Status: COMPLETED | OUTPATIENT
Start: 2019-07-16 | End: 2019-07-16

## 2019-07-16 RX ORDER — LACTULOSE 10 G/15ML
20 SOLUTION ORAL 3 TIMES DAILY
Status: DISCONTINUED | OUTPATIENT
Start: 2019-07-16 | End: 2019-07-18 | Stop reason: HOSPADM

## 2019-07-16 RX ADMIN — POTASSIUM PHOSPHATE, MONOBASIC AND POTASSIUM PHOSPHATE, DIBASIC 20 MMOL: 224; 236 INJECTION, SOLUTION INTRAVENOUS at 11:57

## 2019-07-16 RX ADMIN — ACETYLCYSTEINE 6.25 MG/KG/HR: 200 INJECTION, SOLUTION INTRAVENOUS at 07:46

## 2019-07-16 RX ADMIN — POTASSIUM PHOSPHATE, MONOBASIC AND POTASSIUM PHOSPHATE, DIBASIC 20 MMOL: 224; 236 INJECTION, SOLUTION INTRAVENOUS at 00:32

## 2019-07-16 RX ADMIN — VANCOMYCIN HYDROCHLORIDE 2000 MG: 10 INJECTION, POWDER, LYOPHILIZED, FOR SOLUTION INTRAVENOUS at 20:51

## 2019-07-16 RX ADMIN — ACETYLCYSTEINE 6.25 MG/KG/HR: 200 INJECTION, SOLUTION INTRAVENOUS at 02:03

## 2019-07-16 RX ADMIN — FUROSEMIDE 40 MG: 10 INJECTION, SOLUTION INTRAVENOUS at 11:57

## 2019-07-16 RX ADMIN — PANTOPRAZOLE SODIUM 40 MG: 40 TABLET, DELAYED RELEASE ORAL at 07:59

## 2019-07-16 RX ADMIN — ACETYLCYSTEINE 6.25 MG/KG/HR: 200 INJECTION, SOLUTION INTRAVENOUS at 20:13

## 2019-07-16 RX ADMIN — ACETYLCYSTEINE 6.25 MG/KG/HR: 200 INJECTION, SOLUTION INTRAVENOUS at 13:57

## 2019-07-16 ASSESSMENT — MIFFLIN-ST. JEOR: SCORE: 2732.14

## 2019-07-16 ASSESSMENT — ACTIVITIES OF DAILY LIVING (ADL)
ADLS_ACUITY_SCORE: 14
ADLS_ACUITY_SCORE: 12
ADLS_ACUITY_SCORE: 14
ADLS_ACUITY_SCORE: 12

## 2019-07-16 NOTE — PROGRESS NOTES
"GASTROENTEROLOGY PROGRESS NOTE  Date of Service: 07/16/2019    ASSESSMENT:  Tree Andersen is a 28 year old male with a past medical history of depression presenting as transfer after presentation to OSH following acetaminophen and alcohol overdose for acute liver injury. No evidence of acute liver failure. LFTs and INR down-trending.     MELD-Na: 18.     RECOMMENDATIONS:   - Continue to trend LFTs, INR   - Monitor mental status    - Aggressive repletion of phosphorus    - Continue NAC until LFTs <1000    The patient was discussed and plan agreed upon with GI staff.    Autumn Agosto MD  Gastroenterology/Hepatology Fellow  PGY-6, [id number]  _______________________________________________________________  CC: Vomiting, Tylenol Ingestion    DDx:  Acute Liver Injury 2/2 EtOH/Acetaminophen Overdose   Depression    24 Hour Events: Nil    S: No acute events overnight. Nursing notes reviewed. Patient reports improved abdominal pain, headache and sensitivity to light. Decreased appetite. Does not care to talk about events leading up to ingestion.     O:  Blood pressure 105/63, pulse 88, temperature 99.7  F (37.6  C), temperature source Oral, resp. rate 16, height 1.88 m (6' 2\"), weight (!) 168.2 kg (370 lb 14.4 oz), SpO2 97 %.    Gen: Alert, NAD  HEENT: NC/AT  CV: RRR  Lungs: No increased WOB  Abd: Soft, NT  Skin: No jaundice or rash  MS: WWP  Neuro: AOx3; no focal deficits; no asterixis    LABS:  BMP  Recent Labs   Lab 07/16/19  0542 07/15/19  2002    138   POTASSIUM 3.6 3.5   CHLORIDE 106 104   TYRON 7.8* 7.7*   CO2 25 26   BUN 7 9   CR 0.78 0.78   GLC 93 88     CBC  Recent Labs   Lab 07/16/19  0733 07/15/19  2002   WBC 4.2 4.8   RBC 5.03 5.15   HGB 14.2 14.9   HCT 45.6 46.9   MCV 91 91   MCH 28.2 28.9   MCHC 31.1* 31.8   RDW 12.9 13.1   * 99*     INR  Recent Labs   Lab 07/16/19  0542 07/15/19  2002   INR 1.88* 2.23*     LFTs  Recent Labs   Lab 07/16/19  0542 07/15/19  2002   ALKPHOS 74 75   AST 1,377* 2,441*   ALT " 3,614* 4,879*   BILITOTAL 3.4* 4.2*   PROTTOTAL 5.5* 5.8*   ALBUMIN 2.8* 3.0*      PANCNo lab results found in last 7 days.    MELD-Na score: 18 at 7/16/2019  5:42 AM  MELD score: 18 at 7/16/2019  5:42 AM  Calculated from:  Serum Creatinine: 0.78 mg/dL (Rounded to 1 mg/dL) at 7/16/2019  5:42 AM  Serum Sodium: 139 mmol/L (Rounded to 137 mmol/L) at 7/16/2019  5:42 AM  Total Bilirubin: 3.4 mg/dL at 7/16/2019  5:42 AM  INR(ratio): 1.88 at 7/16/2019  5:42 AM  Age: 28 years

## 2019-07-16 NOTE — PROGRESS NOTES
07/16/19 0840   Quick Adds   Type of Visit Initial PT Evaluation   Living Environment   Lives With parent(s)  (mother)   Living Arrangements house   Home Accessibility stairs to enter home;stairs within home   Number of Stairs, Main Entrance 2   Stair Railings, Main Entrance none   Number of Stairs, Within Home, Primary 10   Stair Railings, Within Home, Primary railing on right side (ascending)   Self-Care   Usual Activity Tolerance moderate   Current Activity Tolerance fair   Regular Exercise No   Equipment Currently Used at Home none   Functional Level Prior   Ambulation 0-->independent   Transferring 0-->independent   Toileting 0-->independent   Bathing 0-->independent   Communication 0-->understands/communicates without difficulty   Swallowing 0-->swallows foods/liquids without difficulty   Cognition 0 - no cognition issues reported   Fall history within last six months no   Which of the above functional risks had a recent onset or change? ambulation   General Information   Onset of Illness/Injury or Date of Surgery - Date 07/15/19   Referring Physician Fidel Brand MD   Patient/Family Goals Statement return home   Pertinent History of Current Problem (include personal factors and/or comorbidities that impact the POC)  28 year old male admitted on 7/15/2019. He has a past medical history significant for major depressive disorder presenting here as a transfer from [facility] for further evaluation and management of an acetaminophen overdose.   Precautions/Limitations fall precautions   Cognitive Status Examination   Orientation orientation to person, place and time   Level of Consciousness alert   Follows Commands and Answers Questions 100% of the time   Personal Safety and Judgment intact   Memory intact   Pain Assessment   Patient Currently in Pain No   Integumentary/Edema   Integumentary/Edema Comments BLE pedal edema 2+ edema, should resolve with elevation and activity as pt reports  "has not ambulated yet.   Posture    Posture Not impaired   Range of Motion (ROM)   ROM Quick Adds No deficits were identified   ROM Comment BLE   Strength   Strength Comments BLE 5/5, pt c/o LE weakness with gait   Bed Mobility   Bed Mobility Comments sup <> sit with HOB flat indep   Transfer Skills   Transfer Comments Sit <> stand SBA with walker, progressing to SBA without AD over course of session   Gait   Gait Comments Pt ambulates 40 ft with FWW and CGAx1, slow speed, WBOS, decreased foot clearance, c/o LE weakness   Balance   Balance Comments CGA and FWW for dynamic balance   Sensory Examination   Sensory Perception Comments denies LE numbness/tinglin   Muscle Tone   Muscle Tone no deficits were identified   Muscle Tone Comments BLE   General Therapy Interventions   Planned Therapy Interventions balance training;bed mobility training;gait training;neuromuscular re-education;strengthening;stretching;ROM;transfer training;risk factor education;home program guidelines;progressive activity/exercise   Clinical Impression   Criteria for Skilled Therapeutic Intervention yes, treatment indicated   PT Diagnosis impaired functional mobility   Influenced by the following impairments decreased balance, endurance   Functional limitations due to impairments Up to CGA and FWW for gait   Clinical Presentation Evolving/Changing   Clinical Presentation Rationale PMH and clinical judgment   Clinical Decision Making (Complexity) Moderate complexity   Therapy Frequency 3x/week   Predicted Duration of Therapy Intervention (days/wks) 7/25/19   Anticipated Discharge Disposition Home with Assist   Risk & Benefits of therapy have been explained Yes   Patient, Family & other staff in agreement with plan of care Yes   [facility] [employer]-PAC TM \"6 Clicks\"   2016, Trustees of [facility], under license to [employer].  All rights reserved.   6 Clicks Short Forms Basic Mobility Inpatient Short Form   [facility] AM-PAC  \"6 " "Clicks\" V.2 Basic Mobility Inpatient Short Form   1. Turning from your back to your side while in a flat bed without using bedrails? 4 - None   2. Moving from lying on your back to sitting on the side of a flat bed without using bedrails? 4 - None   3. Moving to and from a bed to a chair (including a wheelchair)? 4 - None   4. Standing up from a chair using your arms (e.g., wheelchair, or bedside chair)? 4 - None   5. To walk in hospital room? 3 - A Little   6. Climbing 3-5 steps with a railing? 3 - A Little   Basic Mobility Raw Score (Score out of 24.Lower scores equate to lower levels of function) 22   Total Evaluation Time   Total Evaluation Time (Minutes) 4     "

## 2019-07-16 NOTE — PLAN OF CARE
OT 6B: eval orders received. Per chart review and discussion with interdisciplinary team, no acute IP OT needs identified at this time.   Per PT pt. lives in a house with his mother who is available to A as needed and pt will only be alone a few hrs/day. pt. Indep with sock donning, has a walk in shower, states his memory is at baseline, A& Ox3, and neither pt nor staff identifies any OT needs, thus pt declines OT referral.  OT orders completed.

## 2019-07-16 NOTE — PLAN OF CARE
Shift:   VS: Temp: 99.3  F (37.4  C) Temp src: Oral BP: 126/87 Pulse: 111   Resp: 18 SpO2: 94 % O2 Device: None (Room air)    Pain: Pt c/o mild headache but stated it's manageable.   Neuro: A&Ox4, calls appropriately  Cardiac:   Sinus tachycardiac in 110s. Denies chest pain  Respiratory: RA. Denies SOB.  GI/Diet/Appetite: Good oral intake. No N/V reported. No BM this shift  :  Adequate UO  LDA's: PIV with acetylcysteine infusing at 87.3ml/hr  Skin: No new deficit noted  Activity: Up to bathroom with SBA  Tests/Procedures:   Pertinent Labs/Lab Collection: Need UA/UC sample. ALT/AST critically elevated, primary team notified. Phos need to be replaced, pharmacy texted for med.     Plan: Pt to remain NPO after midnight for abdomen US tomorrow morning. Continue with cares and update MD with any changes.

## 2019-07-16 NOTE — CONSULTS
"Consult Date:  07/16/2019      PSYCHIATRIC CONSULTATION      DATE OF CONSULTATION:  07/16/2019.      IDENTIFICATION:  Mr. Tree Andersen is a 28-year-old father of 1, who is hospitalized after a serious Tylenol overdose.  I am asked to evaluate his depression, by Dr. Roman.      Prior to interviewing this patient, I had an opportunity to review the electronic medical record and note that he was diagnosed with depression a little less than a year ago.  He was treated by family practice and started on Wellbutrin.  Although the patient tells me he could not tell much benefit from Wellbutrin, the notes suggest that it had been helpful.  He apparently ran out of Wellbutrin about 3 weeks ago and did not refill his prescription.      On interview, the patient has quite a blunted affect.  He reports that he was playing X-Box with a friend.  It was his friend's birthday, \"So we decided to have a few drinks.\"  He then drank 5 glasses of vodka and impulsively overdosed on 60 Tylenol tablets.  He had remarkable increases in his liver function tests; however, they are currently improving.  His INR is coming down and it appears his liver will likely recover.      On interview, the patient admits that he has been depressed; however, he seems to be quite stubborn in his insistence on not taking any antidepressants, other than \"CBD oil\".  He has a friend who runs a company that sells CBD and he is quite convinced that that is the best medicine for him, although he has personally not tried it.  He is not interested in a standard antidepressant, and he is not interested in pursuing therapy, although I certainly encouraged him to try a standard SSRI and cognitive behavioral therapy.  The patient's mother is in the room.  This has been quite frustrating for her.  She has depression and is treated with Wellbutrin and Cymbalta, finds it helpful.  She also felt that Lexapro would be a good idea and has tried it herself with good results in " "the past.  She reports her  and their other son also have major depressive disorder, but are not being treated.      This patient was working at a [employer] for [employer].  He went on a leave of absence, secondary to the depression, a few months ago.  He reports he is intending to return to work at some point, but for now he is living with his mother.        The patient is  blunted and appears  depressed.  He reports that he is  tired, but no longer suicidal.  He reports this was a mistake and that he is not planning to commit suicide.  Although this is somewhat of a frustrating case, he is not suicidal, he is not \"committable\" and I certainly cannot make him take medications or go to therapy, though I do think it would be in his best interest.      PAST MEDICAL HISTORY:  Major depressive disorder.      ALLERGIES:  HE HAS NO KNOWN ALLERGIES.      FAMILY HISTORY:  As mentioned, his father, mother and brother all have major depressive disorder.      SOCIAL HISTORY:  The patient is a nonsmoker.  He reports he rarely drinks, but the night of his suicide attempt, he drank 5 glasses of vodka.  He reports he views that as a mistake.  He has 1 son, who is age 8.      PHYSICAL REVIEW OF SYSTEMS:  The patient denied headache or problems with vision or hearing.  He denied chest pain, but reports intermittent shortness of breath.  He denied abdominal pain or diarrhea.  He reports he does have dark urine.  He has not had any bleeding.  He denies problems with muscles, skin or joints, other than being a little unsteady when he walks.      MENTAL STATUS EXAMINATION:  On my interview, the patient was generally pleasant and cooperative, but also used very few words.  His mood appears depressed.  His affect is blunted to flat.  His speech is soft and slow, but coherent and goal oriented.  His associations are tight and his thought processes are logical and linear.  His content of thought is without current psychosis or " suicidal ideation.  Recent and remote memory, concentration, fund of knowledge, and use of language appear to be at baseline, as does muscle strength and tone.  He is alert and oriented x 3.  Insight and judgment are guarded.  Recent vital signs include a temperature of 99.7, pulse of 88, respiration rate of 16, with 97% oxygen saturation, and a blood pressure of 105/63.      ASSESSMENT:  Major depressive disorder, recent alcohol abuse x 1.      RECOMMENDATION:   1.  Abstain from alcohol.   2.  I recommend Lexapro 10 mg in the morning.  Unfortunately, the patient is not interested.   3.  I recommend cognitive behavioral therapy.  Again, the patient is not interested; however, the patient does report that he will go back and see his primary care physician.  Perhaps primary care could refer him to a therapist.  At this point, the patient is somewhat hesitant to try  typical treatment for depression.  He reports CBD oil has been helpful for his friends.         MARLEN CARVER MD             D: 2019   T: 2019   MT: MICK      Name:     DARRIAN HAMILTON   MRN:      [id number]        Account:       [id number]   :      1990           Consult Date:  2019      Document: [id number]

## 2019-07-16 NOTE — PLAN OF CARE
PT 6B: Evaluation completed and treatment initiated.   Discharge Planner PT   Patient plan for discharge: return home with A of mother as needed (he's only home alone a few hours/day)  Current status: Pt progressed from FWW to no AD with CGA, to close SBA, no LOB with head turns, but still slow gait speed and c/o LEs feel weak.      Pt to walk halls, next to rail, in view of nursing staff 3+x/day.  If feeling poorly, pt to request staff assistance.  If pt wanting to leave unit, pt to notify nursing and have someone such as his mother with, and a plan for seated rest breaks.  Barriers to return to prior living situation: fatigue, LE soreness with activity, decreased balance, decreased speed, DUMONT (but O2 sats remain mid 90s on room air)  Recommendations for discharge: Based on mobility, home once demonstrates independent gait with balance challenges and SBA for stairs.  Defer to medical team regarding recs for follow up for depression  Rationale for recommendations: Pt lives in a house with his mother who is available to A as needed and pt will only be alone a few hrs/day.  Pt made significant progress within course of 1 session and demonstrates adequate stair negotiation to return home.   Demonstrates indep sock donning, has a walk in shower, states his memory is at baseline, A& Ox3, and neither pt nor staff identifies any OT needs, thus pt declines OT referral.           Entered by: Azeb Ricketts 07/16/2019 9:54 AM

## 2019-07-16 NOTE — PROGRESS NOTES
"/63 (BP Location: Right arm)   Pulse 86   Temp 98.6  F (37  C) (Oral)   Resp 16   Ht 1.88 m (6' 2\")   Wt (!) 168.2 kg (370 lb 14.4 oz)   SpO2 97%   BMI 47.62 kg/m       Neuro: A&Ox4.  Neuro check intact. Mood ok, flat affect.  Cardiac: Afebrile, VSS.   Respiratory: RA   GI/: Voiding spontaneously. No BM this shift.   Diet/appetite: Tolerating diet. Denies nausea   Activity: Up SBA  Pain: Denies   Skin: No new deficits noted.  Lines:  PIV x2  Drains: None      Cont acetylcysteine gtt. Phos replaced. Mom and dad updated of POC.. Will continue to monitor and follow plan of care.   Has Transfer orders.    "

## 2019-07-16 NOTE — PLAN OF CARE
"Neuro: A&Ox4. Passed all Q2H Neuro checks  Cardiac: /64 (BP Location: Right arm)   Pulse 111   Temp 98.8  F (37.1  C) (Oral)   Resp 16   Ht 1.88 m (6' 2\")   Wt (!) 168.2 kg (370 lb 14.4 oz)   SpO2 98%   BMI 47.62 kg/m     Respiratory: Sating 98% on RA.  GI/: Adequate urine output. No BM during shift.  Diet/appetite: Tolerating Regular diet. Eating well. NPO for Abdominal US today.  Activity:  Stand by assist up to chair and in halls.  Pain: At acceptable level on current regimen.   Skin: No new deficits noted.  LDA's: L/R PIV    Plan: Continue with POC. Notify primary team with changes.   "

## 2019-07-17 ENCOUNTER — APPOINTMENT (OUTPATIENT)
Dept: PHYSICAL THERAPY | Facility: CLINIC | Age: 29
End: 2019-07-17
Attending: INTERNAL MEDICINE
Payer: COMMERCIAL

## 2019-07-17 LAB
ALBUMIN SERPL-MCNC: 2.9 G/DL (ref 3.4–5)
ALBUMIN SERPL-MCNC: 3 G/DL (ref 3.4–5)
ALP SERPL-CCNC: 66 U/L (ref 40–150)
ALP SERPL-CCNC: 72 U/L (ref 40–150)
ALT SERPL W P-5'-P-CCNC: 2118 U/L (ref 0–70)
ALT SERPL W P-5'-P-CCNC: 2516 U/L (ref 0–70)
AMMONIA PLAS-SCNC: 49 UMOL/L (ref 10–50)
AMMONIA PLAS-SCNC: 58 UMOL/L (ref 10–50)
ANION GAP SERPL CALCULATED.3IONS-SCNC: 6 MMOL/L (ref 3–14)
ANION GAP SERPL CALCULATED.3IONS-SCNC: 9 MMOL/L (ref 3–14)
AST SERPL W P-5'-P-CCNC: 411 U/L (ref 0–45)
AST SERPL W P-5'-P-CCNC: 558 U/L (ref 0–45)
BILIRUB SERPL-MCNC: 2.2 MG/DL (ref 0.2–1.3)
BILIRUB SERPL-MCNC: 2.5 MG/DL (ref 0.2–1.3)
BUN SERPL-MCNC: 8 MG/DL (ref 7–30)
BUN SERPL-MCNC: 9 MG/DL (ref 7–30)
CALCIUM SERPL-MCNC: 7.7 MG/DL (ref 8.5–10.1)
CALCIUM SERPL-MCNC: 7.9 MG/DL (ref 8.5–10.1)
CHLORIDE SERPL-SCNC: 107 MMOL/L (ref 94–109)
CHLORIDE SERPL-SCNC: 109 MMOL/L (ref 94–109)
CO2 SERPL-SCNC: 24 MMOL/L (ref 20–32)
CO2 SERPL-SCNC: 25 MMOL/L (ref 20–32)
CREAT SERPL-MCNC: 0.78 MG/DL (ref 0.66–1.25)
CREAT SERPL-MCNC: 0.82 MG/DL (ref 0.66–1.25)
ERYTHROCYTE [DISTWIDTH] IN BLOOD BY AUTOMATED COUNT: 13.2 % (ref 10–15)
GFR SERPL CREATININE-BSD FRML MDRD: >90 ML/MIN/{1.73_M2}
GFR SERPL CREATININE-BSD FRML MDRD: >90 ML/MIN/{1.73_M2}
GLUCOSE SERPL-MCNC: 83 MG/DL (ref 70–99)
GLUCOSE SERPL-MCNC: 98 MG/DL (ref 70–99)
HCT VFR BLD AUTO: 46.3 % (ref 40–53)
HGB BLD-MCNC: 14.5 G/DL (ref 13.3–17.7)
INR PPP: 1.36 (ref 0.86–1.14)
INR PPP: 1.43 (ref 0.86–1.14)
MAGNESIUM SERPL-MCNC: 2 MG/DL (ref 1.6–2.3)
MAGNESIUM SERPL-MCNC: 2.1 MG/DL (ref 1.6–2.3)
MCH RBC QN AUTO: 28.6 PG (ref 26.5–33)
MCHC RBC AUTO-ENTMCNC: 31.3 G/DL (ref 31.5–36.5)
MCV RBC AUTO: 91 FL (ref 78–100)
PHOSPHATE SERPL-MCNC: 2.2 MG/DL (ref 2.5–4.5)
PHOSPHATE SERPL-MCNC: 2.3 MG/DL (ref 2.5–4.5)
PLATELET # BLD AUTO: 117 10E9/L (ref 150–450)
POTASSIUM SERPL-SCNC: 3.5 MMOL/L (ref 3.4–5.3)
POTASSIUM SERPL-SCNC: 3.6 MMOL/L (ref 3.4–5.3)
PROT SERPL-MCNC: 5.7 G/DL (ref 6.8–8.8)
PROT SERPL-MCNC: 6 G/DL (ref 6.8–8.8)
RBC # BLD AUTO: 5.07 10E12/L (ref 4.4–5.9)
SODIUM SERPL-SCNC: 140 MMOL/L (ref 133–144)
SODIUM SERPL-SCNC: 141 MMOL/L (ref 133–144)
WBC # BLD AUTO: 4.7 10E9/L (ref 4–11)

## 2019-07-17 PROCEDURE — 25000125 ZZHC RX 250: Performed by: STUDENT IN AN ORGANIZED HEALTH CARE EDUCATION/TRAINING PROGRAM

## 2019-07-17 PROCEDURE — 25000128 H RX IP 250 OP 636: Performed by: STUDENT IN AN ORGANIZED HEALTH CARE EDUCATION/TRAINING PROGRAM

## 2019-07-17 PROCEDURE — 25800030 ZZH RX IP 258 OP 636: Performed by: STUDENT IN AN ORGANIZED HEALTH CARE EDUCATION/TRAINING PROGRAM

## 2019-07-17 PROCEDURE — 97530 THERAPEUTIC ACTIVITIES: CPT | Mod: GP

## 2019-07-17 PROCEDURE — 82140 ASSAY OF AMMONIA: CPT | Performed by: STUDENT IN AN ORGANIZED HEALTH CARE EDUCATION/TRAINING PROGRAM

## 2019-07-17 PROCEDURE — 97112 NEUROMUSCULAR REEDUCATION: CPT | Mod: GP

## 2019-07-17 PROCEDURE — 85610 PROTHROMBIN TIME: CPT | Performed by: STUDENT IN AN ORGANIZED HEALTH CARE EDUCATION/TRAINING PROGRAM

## 2019-07-17 PROCEDURE — 80053 COMPREHEN METABOLIC PANEL: CPT | Performed by: STUDENT IN AN ORGANIZED HEALTH CARE EDUCATION/TRAINING PROGRAM

## 2019-07-17 PROCEDURE — 85027 COMPLETE CBC AUTOMATED: CPT | Performed by: STUDENT IN AN ORGANIZED HEALTH CARE EDUCATION/TRAINING PROGRAM

## 2019-07-17 PROCEDURE — 84100 ASSAY OF PHOSPHORUS: CPT | Performed by: STUDENT IN AN ORGANIZED HEALTH CARE EDUCATION/TRAINING PROGRAM

## 2019-07-17 PROCEDURE — 97116 GAIT TRAINING THERAPY: CPT | Mod: GP

## 2019-07-17 PROCEDURE — 25000132 ZZH RX MED GY IP 250 OP 250 PS 637: Performed by: STUDENT IN AN ORGANIZED HEALTH CARE EDUCATION/TRAINING PROGRAM

## 2019-07-17 PROCEDURE — 87040 BLOOD CULTURE FOR BACTERIA: CPT | Performed by: STUDENT IN AN ORGANIZED HEALTH CARE EDUCATION/TRAINING PROGRAM

## 2019-07-17 PROCEDURE — 12000001 ZZH R&B MED SURG/OB UMMC

## 2019-07-17 PROCEDURE — 99233 SBSQ HOSP IP/OBS HIGH 50: CPT | Mod: GC | Performed by: STUDENT IN AN ORGANIZED HEALTH CARE EDUCATION/TRAINING PROGRAM

## 2019-07-17 PROCEDURE — 36415 COLL VENOUS BLD VENIPUNCTURE: CPT | Performed by: STUDENT IN AN ORGANIZED HEALTH CARE EDUCATION/TRAINING PROGRAM

## 2019-07-17 PROCEDURE — 83735 ASSAY OF MAGNESIUM: CPT | Performed by: STUDENT IN AN ORGANIZED HEALTH CARE EDUCATION/TRAINING PROGRAM

## 2019-07-17 RX ADMIN — PANTOPRAZOLE SODIUM 40 MG: 40 TABLET, DELAYED RELEASE ORAL at 08:13

## 2019-07-17 RX ADMIN — LACTULOSE 20 G: 20 SOLUTION ORAL at 00:08

## 2019-07-17 RX ADMIN — ACETYLCYSTEINE 6.25 MG/KG/HR: 200 INJECTION, SOLUTION INTRAVENOUS at 15:15

## 2019-07-17 RX ADMIN — VANCOMYCIN HYDROCHLORIDE 2000 MG: 10 INJECTION, POWDER, LYOPHILIZED, FOR SOLUTION INTRAVENOUS at 08:10

## 2019-07-17 RX ADMIN — ACETYLCYSTEINE 6.25 MG/KG/HR: 200 INJECTION, SOLUTION INTRAVENOUS at 01:59

## 2019-07-17 RX ADMIN — POTASSIUM PHOSPHATE, MONOBASIC AND POTASSIUM PHOSPHATE, DIBASIC 15 MMOL: 224; 236 INJECTION, SOLUTION INTRAVENOUS at 11:52

## 2019-07-17 RX ADMIN — LACTULOSE 20 G: 20 SOLUTION ORAL at 15:25

## 2019-07-17 RX ADMIN — LACTULOSE 20 G: 20 SOLUTION ORAL at 19:51

## 2019-07-17 RX ADMIN — LACTULOSE 20 G: 20 SOLUTION ORAL at 08:16

## 2019-07-17 RX ADMIN — ACETYLCYSTEINE 6.25 MG/KG/HR: 200 INJECTION, SOLUTION INTRAVENOUS at 08:11

## 2019-07-17 ASSESSMENT — ACTIVITIES OF DAILY LIVING (ADL)
ADLS_ACUITY_SCORE: 13
ADLS_ACUITY_SCORE: 12
ADLS_ACUITY_SCORE: 13

## 2019-07-17 ASSESSMENT — MIFFLIN-ST. JEOR: SCORE: 2706.75

## 2019-07-17 NOTE — PHARMACY-VANCOMYCIN DOSING SERVICE
Pharmacy Vancomycin Initial Note  Date of Service 2019  Patient's  1990  28 year old, male    Indication: Bacteremia    Current estimated CrCl = Estimated Creatinine Clearance: 219.8 mL/min (based on SCr of 0.84 mg/dL).    Creatinine for last 3 days  7/15/2019:  8:02 PM Creatinine 0.78 mg/dL  2019:  5:42 AM Creatinine 0.78 mg/dL; 12:04 PM Creatinine 0.84 mg/dL    Recent Vancomycin Level(s) for last 3 days  No results found for requested labs within last 72 hours.      Vancomycin IV Administrations (past 72 hours)      No vancomycin orders with administrations in past 72 hours.                Nephrotoxins and other renal medications (From now, onward)    Start     Dose/Rate Route Frequency Ordered Stop    19  vancomycin (VANCOCIN) 2,000 mg in sodium chloride 0.9 % 500 mL intermittent infusion      2,000 mg  over 2 Hours Intravenous EVERY 12 HOURS 19            Contrast Orders - past 72 hours (72h ago, onward)    None                Plan:  1.  Start vancomycin  2000 mg IV q12h (17.2mg/kg using adjusted body weight = 116.6kg). Adjusted body weight used given ABW = 168.2kg and BMI = 47.62).   2.  Goal Trough Level: 15-20 mg/L   3.  Pharmacy will check trough levels as appropriate in 1-3 Days.    4. Serum creatinine levels will be ordered daily for the first week of therapy and at least twice weekly for subsequent weeks.    5. Calvin method utilized to dose vancomycin therapy: Method 2    Cassandra Morrison, PharmD

## 2019-07-17 NOTE — PLAN OF CARE
Discharge Planner PT  - 6B  Patient plan for discharge: Home with assist.  Current status: Pt completes bed mobility and transfers IND. Pt ambulates >500ft with no AD IND, no LOB. Pt ascends/descends 4 steps 4x with single HR and Mod I. Pt ascends/descends additional 2 steps with no HR IND. Pt engaged in standing dynamic balance challenges, no LOB.  Barriers to return to prior living situation: Medical needs.  Recommendations for discharge: Home with assist.  Rationale for recommendations: Pt mobilizing safely - appropriate to discharge home with assist once medically cleared. All IP PT goals met - pt with no further acute PT needs. PT to complete orders - please re-consult if change in functional mobility status.    Physical Therapy Discharge Summary    Reason for therapy discharge:    All goals and outcomes met, no further needs identified.    Progress towards therapy goal(s). See goals on Care Plan in [facility] electronic health record for goal details.  Goals met.    Therapy recommendation(s):    No further therapy is recommended. Ambulate in hallway 3-4x/day to promote strength/endurance.

## 2019-07-17 NOTE — PROGRESS NOTES
[facility], [place]    Progress Note - Antoinette 4 Service        Date of Admission:  7/15/2019    Assessment & Plan   Tree Andersen is a 28 year old man with a history of MDD who presented following acetaminophen overdose, found to have drug-induced liver injury, now with Gram positive bacteremia.    # Acetaminophen overdose  # Acute liver injury  Took approximately 60 tablets of acetaminophen around 0300 on 7/13. Upon presentation at OSH: , , INR 1.2, acetaminophen level 50. Began mucomyst protocol and received 5mg IV vitamin K. ALT increased to >3500, AST to >2000, INR to 2.8, acetaminophen level <2 after 24 hours. Admission labs here at [facility] notable for elevated ammonia to 57, ALT 4879, AST 2441. INR 2.23, platelets 99. Lactic acid wnl at 1.7. Phos low at 1.3. Patient not encephalopathic at the time of admission here. LFTs downtrending. Discussed with [place] Poison Control. Acetaminophen level upon presentation at OSH: 50 (7/13 at 1650). CXR with no acute intrapulmonary pathology. RUQ U/S with heterogeneously coarsened hepatic echotexture and diffusely increased hepatic echogenicity, patent RUQ vasculature. UA with 10 ketones, small blood.   - Hepatology consult, appreciate recs  - Continue NAC protocol  - Q4H neuro-checks  - Continue trending CMP, INR,   - Continue NAC until LFTs <1000  - Pantoprazole 40mg PO Qday (ulcer ppx)  - Elevate head of bed to 30 degrees  - If change in mental status, stat CT head and contact GI    # Gram positive bacteremia: No obvious source. Afebrile, hemodynamically stable. Will start IV vanc, may consult ID depending on speciation.  - Vancomycin  - Daily surveillance cultures  - Consider TTE in AM     # MDD  # Suicide attempt  # History of passive suicidal ideation  Diagnosed with MDD one year ago. Prescribed wellbutrin approximately 3 months ago, but had stopped in the last few weeks (ran out of pills). Acetaminophen ingestion occurred during  "a period of acute alcohol intoxication. Endorses prior passive suicide ideation. Patient denies psychotherapy or medical therapy for depression, would rather try CBD oil.  - Psych consult   - Alcohol abstinence   - Lexapro 10 mg PO QAM, patient declined   - CBT, patient not interested  - Hold PTA wellbutrin for now given hepatic metabolism  - Will consider psychiatry consult while inpatient if patient is willing/interested    Diet: Regular Diet  Fluids: None  DVT Prophylaxis: Mechanical  Johnston Catheter: Not present  Code Status: Full    Disposition Plan   Expected discharge: 2 - 3 days, recommended to prior living arrangement once mental status at baseline and liver labs show clear trend of improvement.  Entered: Roger Roman MD 07/16/2019, 7:20 PM     The patient was seen and discussed with Dr. Toney Klein, who agrees with the assessment and plan.    Roger Roman MD PhD  [address], [place]  Pager: [phone]  Please see sticky note for cross cover information  ______________________________________________________________________    Interval History   No acute events overnight. Mother at bedside this morning. Patient notes that he is \"really disappointed\" with himself, and notes that he has \"to live with this.\" Denies current active or passive SI or HI. Notes he was intoxicated with alcohol when he made the decision to ingest all of the acetaminophen. Per patient, does not drink often, only a few times a year, does not smoke cigarettes or marijuana, no other illicits or IVDU. At one point he said he \"hate[s]\" himself for doing this. Denies headache, vision changes, fever, chills, nausea, vomiting, chest pain, shortness of breath. Mother thinks patient is a bit puffy.     Data reviewed today: I reviewed all medications, new labs and imaging results over the last 24 hours. I personally reviewed the RUQ U/S image(s) showing coarsened hepatic echotexture, " diffusely increased hepatic echogenicity.    Physical Exam   Vital Signs: Temp: 97.7  F (36.5  C) Temp src: Oral BP: 129/69 Pulse: 89   Resp: 16 SpO2: 97 % O2 Device: None (Room air)    Weight: 370 lbs 14.4 oz  General: Indifferent young man in NAD with very flat affect  HEENT: AT/NC, conjunctivae with some injection, anicteric sclerae, benign oropharynx, MMM  Neck: Supple  Cardiovascular: RRR, normal S1 S2, no m/r/g, 2+ peripheral pulses, trace/1+ BLE edema  Respiratory: LCTAB, no w/r/r, normal respiratory effort  Abdominal: Soft, distended due to habitus, nontender, normal bowel sounds, difficult to assess for hepatosplenomegaly given habitus  Musculoskeletal: Obese bulk, no obvious joint abnormalities  Neurologic: CN II-XII grossly intact, no focal deficits, AAOx4  Psych: Flat affect    Data   Recent Labs   Lab 07/16/19  1204 07/16/19  0733 07/16/19  0542 07/15/19  2002   WBC  --  4.2  --  4.8   HGB  --  14.2  --  14.9   MCV  --  91  --  91   PLT  --  101*  --  99*   INR 1.74*  --  1.88* 2.23*     --  139 138   POTASSIUM 3.5  --  3.6 3.5   CHLORIDE 105  --  106 104   CO2 26  --  25 26   BUN 7  --  7 9   CR 0.84  --  0.78 0.78   ANIONGAP 8  --  8 8   TYRON 8.1*  --  7.8* 7.7*   GLC 82  --  93 88   ALBUMIN 3.0*  --  2.8* 3.0*   PROTTOTAL 5.8*  --  5.5* 5.8*   BILITOTAL 3.7*  --  3.4* 4.2*   ALKPHOS 74  --  74 75   ALT 3,350*  --  3,614* 4,879*   AST 1,053*  --  1,377* 2,441*     Recent Results (from the past 24 hour(s))   XR Chest Port 1 View    Narrative    EXAM: XR CHEST PORT 1 VW  7/15/2019 8:27 PM      HISTORY: evaluate for any new pulmonary infiltrate    COMPARISON: None.    FINDINGS: Semiupright AP radiograph of the chest. Lungs are  hypoinflated. The trachea is midline. The cardiac silhouette is within  normal limits. No pleural effusion or pneumothorax. No focal airspace  opacities. Upper abdomen is unremarkable.       Impression    IMPRESSION: No focal airspace opacities. Lungs are hypoinflated.    I  have personally reviewed the examination and initial interpretation  and I agree with the findings.    MACARENA REILLY MD   US Abdomen Limited w Abd/Pelvis Duplex Complete    Narrative    EXAMINATION: US ABDOMEN COMPLETE WITH DOPPLER, 7/16/2019 10:31 AM     COMPARISON: None.    HISTORY: Acute liver injury with coagulopathy, no hepatic  encephalopathy, evaluate for vascular thrombosis    TECHNIQUE: The abdomen was scanned in standard fashion with  specialized ultrasound transducer(s) using both gray-scale, color  Doppler, and spectral flow techniques.    Findings:  Evaluation somewhat limited secondary to body habitus, overlying bowel  gas.    Liver: The liver demonstrates heterogeneously coarsened echotexture  and increased echogenicity. No evidence of a focal hepatic mass.     Extrahepatic portal vein flow is antegrade, measuring 23 cm/sec.  Right portal vein flow is antegrade, measuring 15 cm/sec.  Left portal vein flow is antegrade, measuring 8 cm/sec.    Flow in the hepatic artery is towards the liver and:  118 cm/sec peak systolic  0.68 resistive index.     Splenic vein is not seen. The left, middle, and right hepatic veins  are patent with flow towards the IVC. The IVC is patent with flow  towards the heart.   The visualized aorta is not dilated.    Gallbladder: There is no wall thickening, pericholecystic fluid,  positive sonographic Linares's sign or evidence for cholelithiasis    Bile Ducts: Both the intra- and extrahepatic biliary system are of  normal caliber.  The common bile duct measures 5 mm in diameter.    Pancreas: Pancreas not visualized.    Kidney: The right kidney measures 12.0 cm long. There is no  hydronephrosis or hydroureter, no shadowing renal calculi, cystic  lesion or mass.       Impression    Impression:   1.  Heterogeneously coarsened hepatic echotexture and diffusely  increased hepatic echogenicity, likely representing intraparenchymal  liver disease such as hepatic steatosis.  2.  Patent  right upper quadrant vasculature without evidence of  thrombosis. Splenic vein not visualized.    I have personally reviewed the examination and initial interpretation  and I agree with the findings.    MALORIE ASTUDILLO, DO     Medications     acetylcysteine (ACETADOTE) infusion *third dose - elevated AST* 6.25 mg/kg/hr (07/16/19 1700)       pantoprazole  40 mg Oral QAM AC     sodium chloride (PF)  3 mL Intracatheter Q8H

## 2019-07-17 NOTE — PLAN OF CARE
"Blood pressure 134/73, pulse 89, temperature 98.1  F (36.7  C), temperature source Oral, resp. rate 16, height 1.88 m (6' 2\"), weight (!) 168.2 kg (370 lb 14.4 oz), SpO2 95 %.  Neuro: A&Ox4.   Cardiac: SR. VSS.   Respiratory: Sating 95-98% on RA.  GI/: Adequate urine output. No BM this shift.  Diet/appetite: Tolerating regular diet. Eating well.  Activity:  Standby Assist of  1 up to chair and in halls.  Pain: At acceptable level on current regimen.   Skin: No new deficits noted.  LDA's: PIV x 2    Plan: Positive blood cultures- pt on antibiotics- infectious disease following. Continue with POC. Notify primary team with changes.    "

## 2019-07-17 NOTE — CONSULTS
GENERAL INFECTIOUS DISEASES CONSULTATION     Patient:  Tree Andersen   Date of birth 1990, Medical record number [id number]  Date of Visit:  07/17/2019  Date of Admission: 7/15/2019  Consult Requested by:Toney Klein,*  Reason for Consult:  Staph epidermidis bacteremia, unknown source          Assessment and Recommendations:     Tree Andersen is a 27 yo male with history of major depression, obesity, admitted on 7/15/19 , as a  transfer from [facility] for further management of acetaminophen overdose and acute liver injury. he had 5 alcoholic /vodka drinks earlier and ingested 60 tabs of 500 mg acetaminophen. ID is consulted for Staph epidermidis in 1/2 blood culture .     1.  Positive blood culture - 1/2 + for Staphylococcus epidermidis mecA gene neg   - no signs or symptoms of infection     2. Major depression with alcohol  and acetaminophen overdose     3. acute liver injury from acetaminophen overdose / alcohol     RECOMMENDATION:  -  no signs/symptoms of infection. this is likely from skin contamination.  Staph epidermidis  is a normal skin graham  - follow-up blood culture on 7/17. ( Ideally, blood cultures should be repeated before antibiotic is initiated especially if contamination is suspected)   - no need to evaluate for endocarditis   - can stop Vancomycin IV and monitor   - if fever, repeat blood cultures x 2     Plan discussed with patient and Primary team     Thank you for allowing us to participate in the care of this patient    Timothy Medina MD, M.Med.Sc  Staff, Infectious Diseases   Pager : [phone]         History of Present Illness:     Tree Andersen is a 27 yo male with history of major depression, obesity, admitted on 7/15/19 , as a  transfer from [facility] for further management of acetaminophen overdose and acute liver injury. he had 5 alcoholic /vodka drinks and ingested 60 tabs of 500 mg acetaminophen. the next day he had headaches,  nausea and vomited. initially he had RUQ abdominal pain, but denies any abdominal pain. he has diarrhea from lactulose. his appetite is improving, no nausea, vomiting, and feeling better. no fever, chills, sweatings, shortness of breath, cough, chest pain.      ID is consulted for positive blood culture ( verigene nucleic acid test - positive for Staph epidemidis neg for mecA gene)  1/2+ on 7/15/19. He is on Vancomycin since 7/16 but a repeat blood cx is obtained only today 7/17/19.      CXR 7/15/19 - IMPRESSION: No focal airspace opacities. Lungs are hypoinflated    Abdominal US 7/16/19 - Impression: Heterogeneously coarsened hepatic echotexture and diffusely  increased hepatic echogenicity, likely representing intraparenchymal liver disease such as hepatic steatosis.  Patent right upper quadrant vasculature without evidence of thrombosis. Splenic vein not visualized.    Recent culture results include:  Culture Micro   Date Value Ref Range Status   07/17/2019 No growth after 1 hour  Preliminary   07/15/2019 (A)  Preliminary    Cultured on the 1st day of incubation:  Staphylococcus epidermidis  Susceptibility testing in progress     07/15/2019   Preliminary    Critical Value/Significant Value, preliminary result only, called to and read back by  Danni Farley Rn on 07.16.2019 at 1915.  JRT     07/15/2019   Preliminary    (Note)  POSITIVE for STAPHYLOCOCCUS EPIDERMIDIS and NEGATIVE for the mecA  gene (not resistant to methicillin) by Verigene nucleic acid test.  The mecA gene was not detected. Final identification and  antimicrobial susceptibility testing will be verified by standard  methods.    Specimen tested with Verigene multiplex, gram-positive blood culture  nucleic acid test for the following targets: Staph aureus, Staph  epidermidis, Staph lugdunensis, other Staph species, Enterococcus  faecalis, Enterococcus faecium, Streptococcus species, S. agalactiae,  S. anginosus grp., S. pneumoniae, S. pyogenes,  Listeria sp., mecA  (methicillin resistance) and Pooja/B (vancomycin resistance).    Critical Value/Significant Value called to and read back by  Danin Farley RN on 07.16.2019 at 2200.  JRT     07/15/2019 No growth after 2 days  Preliminary          Review of Systems:   CONSTITUTIONAL:  No fevers or chills  EYES: negative for icterus  ENT:  negative for hearing loss, tinnitus and sore throat  RESPIRATORY:  negative for cough with sputum and dyspnea  CARDIOVASCULAR:  negative for chest pain, dyspnea  GASTROINTESTINAL:  see HPI   GENITOURINARY:  negative for dysuria  HEME:  No easy bruising  INTEGUMENT:  negative for rash and pruritus  NEURO:  see HPI          Past Medical History:   major depression   obesity          Past Surgical History:   tonsillectomy          Family History:     parents with MDD  mother with PTSD          Social History:     lives with his parents. employed by [employer] , ocasional alcohol , no smoking / recreational drugs          Current Medications (antimicrobials listed in bold):       lactulose  20 g Oral TID     pantoprazole  40 mg Oral QAM AC     sodium chloride (PF)  3 mL Intracatheter Q8H     vancomycin (VANCOCIN) IV  2,000 mg Intravenous Q12H          Allergies:   No Known Allergies         Physical Exam:   Vitals were reviewed  Patient Vitals for the past 24 hrs:   BP Temp Temp src Pulse Heart Rate Resp SpO2 Weight   07/17/19 1112 121/62 98.2  F (36.8  C) Oral 85 -- 18 97 % --   07/17/19 0817 135/84 98.7  F (37.1  C) Oral 84 -- 18 96 % --   07/17/19 0439 -- -- -- -- -- -- -- (!) 165.7 kg (365 lb 4.8 oz)   07/17/19 0430 114/71 98.4  F (36.9  C) Oral 78 82 18 96 % --   07/17/19 0005 97/66 98.8  F (37.1  C) Oral -- 90 18 99 % --   07/16/19 1957 134/73 98.1  F (36.7  C) Oral -- -- 16 95 % --   07/16/19 1601 129/69 97.7  F (36.5  C) Oral 89 -- 16 97 % --   07/16/19 1203 -- 98.6  F (37  C) Oral 86 -- 16 -- --     Ranges for his vital signs:  Temp:  [97.7  F (36.5  C)-98.8  F (37.1  C)] 98.2   F (36.8  C)  Pulse:  [78-89] 85  Heart Rate:  [82-90] 82  Resp:  [16-18] 18  BP: ()/(62-84) 121/62  SpO2:  [95 %-99 %] 97 %    Physical Examination:  GENERAL:  well-developed, well-nourished, alert, oriented in bed in no acute distress.   HEENT:  Head is normocephalic, atraumatic   EYES:  Eyes have anicteric sclerae without conjunctival injection   ENT:  Oropharynx is moist without exudates or ulcers. Tongue is midline  NECK:  Supple. No  Cervical lymphadenopathy  LUNGS:  Clear to auscultation bilateral.   CARDIOVASCULAR:  Regular rate and rhythm with no murmur.  ABDOMEN:  Normal bowel sounds, soft, nontender. obese+   SKIN:  No acute rashes. no cuts/ lesions noted  Line(s) are in place without any surrounding erythema or exudate.   No stigmata of endocarditis.  NEUROLOGIC:  Grossly nonfocal. Active x4 extremities         Laboratory Data:     Hematology Studies    Recent Labs   Lab Test 07/17/19  0421 07/16/19  0733 07/15/19  2002   WBC 4.7 4.2 4.8   ANEU  --   --  3.8   AEOS  --   --  0.1   HGB 14.5 14.2 14.9   MCV 91 91 91   * 101* 99*     Metabolic Studies     Recent Labs   Lab Test 07/17/19 0421 07/16/19 1959 07/16/19 1204 07/16/19 0542 07/15/19  2002    140 140 139 138   POTASSIUM 3.5 3.6 3.5 3.6 3.5   CHLORIDE 107 106 105 106 104   CO2 24 25 26 25 26   BUN 9 8 7 7 9   CR 0.82 0.84 0.84 0.78 0.78   GFRESTIMATED >90 >90 >90 >90 >90     Hepatic Studies    Recent Labs   Lab Test 07/17/19 0421 07/16/19 1959 07/16/19 1204 07/16/19 0542 07/15/19  2002   BILITOTAL 2.5* 2.6* 3.7* 3.4* 4.2*   ALKPHOS 72 74 74 74 75   ALBUMIN 3.0* 3.0* 3.0* 2.8* 3.0*   * 760* 1,053* 1,377* 2,441*   ALT 2,516* 2,899* 3,350* 3,614* 4,879*     Microbiology:  Culture Micro   Date Value Ref Range Status   07/17/2019 No growth after 1 hour  Preliminary   07/15/2019 (A)  Preliminary    Cultured on the 1st day of incubation:  Staphylococcus epidermidis  Susceptibility testing in progress     07/15/2019    Preliminary    Critical Value/Significant Value, preliminary result only, called to and read back by  Danni Farley Rn on 07.16.2019 at 1915.  JRT     07/15/2019   Preliminary    (Note)  POSITIVE for STAPHYLOCOCCUS EPIDERMIDIS and NEGATIVE for the mecA  gene (not resistant to methicillin) by Verigene nucleic acid test.  The mecA gene was not detected. Final identification and  antimicrobial susceptibility testing will be verified by standard  methods.    Specimen tested with Verigene multiplex, gram-positive blood culture  nucleic acid test for the following targets: Staph aureus, Staph  epidermidis, Staph lugdunensis, other Staph species, Enterococcus  faecalis, Enterococcus faecium, Streptococcus species, S. agalactiae,  S. anginosus grp., S. pneumoniae, S. pyogenes, Listeria sp., mecA  (methicillin resistance) and Pooja/B (vancomycin resistance).    Critical Value/Significant Value called to and read back by  Danni Farley RN on 07.16.2019 at 2200.  JRT     07/15/2019 No growth after 2 days  Preliminary     Urine Studies    Recent Labs   Lab Test 07/15/19  2334   LEUKEST Negative   WBCU 1

## 2019-07-17 NOTE — PROGRESS NOTES
"GASTROENTEROLOGY PROGRESS NOTE  Date of Service: 07/17/2019    ASSESSMENT:  28 year old male with h/o depression presenting with acetaminophen/alcohol overdose and ALI without acute liver failure. LFTs and INR improved, ALT now <1000.     RECOMMENDATIONS:   - Discontinue NAC   - Discontinue serial labs   - Patient would likely benefit from inpatient mental health, however, patient not amenable to this   - We believe patient is ongoing threat to self, however, he is competent and understands consequences of his actions    GI will sign-off. No need for hepatology follow-up as outpatient.     The patient was discussed and plan agreed upon with GI staff.    Autumn Agosto MD  Gastroenterology/Hepatology Fellow  [id number], [id number]  _______________________________________________________________  24 Hour Events: Nil    S: No acute events overnight. Nursing notes reviewed. Patient reports doing well, appetite improving, sensitivity to light improving. No chest pain, shortness of breath, fevers, chills. Abdominal pain improving.     O:  Blood pressure 121/62, pulse 85, temperature 98.2  F (36.8  C), temperature source Oral, resp. rate 18, height 1.88 m (6' 2\"), weight (!) 165.7 kg (365 lb 4.8 oz), SpO2 97 %.    Gen: Alert, NAD  HEENT: NC/AT  CV: No edema  Lungs: No increased WOB  Abd: Obese, soft, NT  Skin: No jaundice  MS: WWP, no edema  Neuro: AOx3; no focal deficits    LABS:  BMP  Recent Labs   Lab 07/17/19  1139 07/17/19  0421 07/16/19  1959 07/16/19  1204    141 140 140   POTASSIUM 3.6 3.5 3.6 3.5   CHLORIDE 109 107 106 105   TYRON PENDING 7.9* 7.9* 8.1*   CO2 PENDING 24 25 26   BUN PENDING 9 8 7   CR PENDING 0.82 0.84 0.84   GLC PENDING 83 94 82     CBC  Recent Labs   Lab 07/17/19  0421 07/16/19  0733 07/15/19  2002   WBC 4.7 4.2 4.8   RBC 5.07 5.03 5.15   HGB 14.5 14.2 14.9   HCT 46.3 45.6 46.9   MCV 91 91 91   MCH 28.6 28.2 28.9   MCHC 31.3* 31.1* 31.8   RDW 13.2 12.9 13.1   * 101* 99*     INR  Recent Labs "   Lab 07/17/19  1139 07/17/19  0421 07/16/19 1959 07/16/19  1204   INR 1.43* 1.36* 1.53* 1.74*     LFTs  Recent Labs   Lab 07/17/19  1139 07/17/19  0421 07/16/19 1959 07/16/19  1204   ALKPHOS PENDING 72 74 74   AST PENDING 558* 760* 1,053*   ALT PENDING 2,516* 2,899* 3,350*   BILITOTAL PENDING 2.5* 2.6* 3.7*   PROTTOTAL PENDING 6.0* 6.0* 5.8*   ALBUMIN PENDING 3.0* 3.0* 3.0*      PANCNo lab results found in last 7 days.    MELD-Na score: 14 at 7/17/2019 11:39 AM  MELD score: 14 at 7/17/2019 11:39 AM  Calculated from:  Serum Creatinine: 0.82 mg/dL (Rounded to 1 mg/dL) at 7/17/2019  4:21 AM  Serum Sodium: 140 mmol/L (Rounded to 137 mmol/L) at 7/17/2019 11:39 AM  Total Bilirubin: 2.5 mg/dL at 7/17/2019  4:21 AM  INR(ratio): 1.43 at 7/17/2019 11:39 AM  Age: 28 years

## 2019-07-17 NOTE — PROGRESS NOTES
"/76 (BP Location: Right arm)   Pulse 105   Temp 97.9  F (36.6  C) (Oral)   Resp 16   Ht 1.88 m (6' 2\")   Wt (!) 165.7 kg (365 lb 4.8 oz)   SpO2 96%   BMI 46.90 kg/m      Neuro: A&Ox4.   Cardiac: Afebrile, VSS.            Respiratory: RA   GI/: Voiding spontaneously. No BM this shift.   Diet/appetite: Tolerating diet. Denies nausea   Activity: Up SBA  Pain: Denies   Skin: No new deficits noted.  Lines:  PIV x2  Drains: None        Cont acetylcysteine gtt. Phos replacedWill continue to monitor and follow plan of care. Pt transferred to 5A.             "

## 2019-07-17 NOTE — PLAN OF CARE
AVSS. Telemetry maintained: NSR with no noted ectopy. Denied pain. A+O x4. Scleral hemorrhage present. Mucomyst gtt maintained. Crosscover notified that parameters to discontinue gtt have been met. To discuss with team at am rounds and update nursing staff. Lactulose admin. No stool yet. Void spontaneous, did not save. PIV x2. Up with minimal assist. Able to make needs known. Continue POC.

## 2019-07-17 NOTE — PROGRESS NOTES
Admission/Transfer from:   2 RN skin assessment completed by:  Amirah MCFARLAND RN and Irena CHENEY RN

## 2019-07-17 NOTE — PLAN OF CARE
[id number]: Pt AOX4, VSS on RA. No c/o pain or nausea. Mucomyst gtt DC'ed. Liver enzymes trending down. Phos=2.2, replaced with recheck in am. Tele NSR, HR . 2X LPIV in place, 1 SL the other infusing phos. Up independently. Having loose stools, taking scheduled lactulose 3X/day. Calls/makes needs known. Tolerating reg diet. Will continue to monitor.

## 2019-07-17 NOTE — PLAN OF CARE
Pt arrived to unit around 1400 from 6B. A&Ox4. VSS on RA. 2 L PIV one infusing continuous mucomyst drip, other infusing phos replacement.Bilateral scleral hemorrhage. Per ID - stopped IV ABX. Liver enzymes trending down. On tele in NSR. Tolerating regular diet. Up independent in room.

## 2019-07-18 ENCOUNTER — PATIENT OUTREACH (OUTPATIENT)
Dept: CARE COORDINATION | Facility: CLINIC | Age: 29
End: 2019-07-18

## 2019-07-18 VITALS
WEIGHT: 315 LBS | BODY MASS INDEX: 40.43 KG/M2 | HEIGHT: 74 IN | RESPIRATION RATE: 18 BRPM | TEMPERATURE: 95.9 F | HEART RATE: 83 BPM | OXYGEN SATURATION: 95 % | DIASTOLIC BLOOD PRESSURE: 94 MMHG | SYSTOLIC BLOOD PRESSURE: 164 MMHG

## 2019-07-18 LAB
ALBUMIN SERPL-MCNC: 3 G/DL (ref 3.4–5)
ALP SERPL-CCNC: 65 U/L (ref 40–150)
ALT SERPL W P-5'-P-CCNC: 1614 U/L (ref 0–70)
AMMONIA PLAS-SCNC: 48 UMOL/L (ref 10–50)
ANION GAP SERPL CALCULATED.3IONS-SCNC: 7 MMOL/L (ref 3–14)
AST SERPL W P-5'-P-CCNC: 232 U/L (ref 0–45)
BACTERIA SPEC CULT: ABNORMAL
BILIRUB SERPL-MCNC: 1.8 MG/DL (ref 0.2–1.3)
BUN SERPL-MCNC: 8 MG/DL (ref 7–30)
CALCIUM SERPL-MCNC: 8.2 MG/DL (ref 8.5–10.1)
CHLORIDE SERPL-SCNC: 110 MMOL/L (ref 94–109)
CO2 SERPL-SCNC: 24 MMOL/L (ref 20–32)
CREAT SERPL-MCNC: 0.87 MG/DL (ref 0.66–1.25)
ERYTHROCYTE [DISTWIDTH] IN BLOOD BY AUTOMATED COUNT: 13.2 % (ref 10–15)
GFR SERPL CREATININE-BSD FRML MDRD: >90 ML/MIN/{1.73_M2}
GLUCOSE SERPL-MCNC: 81 MG/DL (ref 70–99)
HCT VFR BLD AUTO: 47.3 % (ref 40–53)
HGB BLD-MCNC: 14.8 G/DL (ref 13.3–17.7)
INR PPP: 1.29 (ref 0.86–1.14)
Lab: ABNORMAL
MAGNESIUM SERPL-MCNC: 1.9 MG/DL (ref 1.6–2.3)
MCH RBC QN AUTO: 28.8 PG (ref 26.5–33)
MCHC RBC AUTO-ENTMCNC: 31.3 G/DL (ref 31.5–36.5)
MCV RBC AUTO: 92 FL (ref 78–100)
PHOSPHATE SERPL-MCNC: 3.7 MG/DL (ref 2.5–4.5)
PLATELET # BLD AUTO: 167 10E9/L (ref 150–450)
POTASSIUM SERPL-SCNC: 3.8 MMOL/L (ref 3.4–5.3)
PROT SERPL-MCNC: 5.9 G/DL (ref 6.8–8.8)
RBC # BLD AUTO: 5.14 10E12/L (ref 4.4–5.9)
SODIUM SERPL-SCNC: 140 MMOL/L (ref 133–144)
SPECIMEN SOURCE: ABNORMAL
WBC # BLD AUTO: 6.2 10E9/L (ref 4–11)

## 2019-07-18 PROCEDURE — 84100 ASSAY OF PHOSPHORUS: CPT | Performed by: INTERNAL MEDICINE

## 2019-07-18 PROCEDURE — 82140 ASSAY OF AMMONIA: CPT | Performed by: INTERNAL MEDICINE

## 2019-07-18 PROCEDURE — 36415 COLL VENOUS BLD VENIPUNCTURE: CPT | Performed by: STUDENT IN AN ORGANIZED HEALTH CARE EDUCATION/TRAINING PROGRAM

## 2019-07-18 PROCEDURE — 36415 COLL VENOUS BLD VENIPUNCTURE: CPT | Performed by: INTERNAL MEDICINE

## 2019-07-18 PROCEDURE — 80053 COMPREHEN METABOLIC PANEL: CPT | Performed by: INTERNAL MEDICINE

## 2019-07-18 PROCEDURE — 83735 ASSAY OF MAGNESIUM: CPT | Performed by: INTERNAL MEDICINE

## 2019-07-18 PROCEDURE — 85610 PROTHROMBIN TIME: CPT | Performed by: INTERNAL MEDICINE

## 2019-07-18 PROCEDURE — 25000132 ZZH RX MED GY IP 250 OP 250 PS 637: Performed by: STUDENT IN AN ORGANIZED HEALTH CARE EDUCATION/TRAINING PROGRAM

## 2019-07-18 PROCEDURE — 85027 COMPLETE CBC AUTOMATED: CPT | Performed by: STUDENT IN AN ORGANIZED HEALTH CARE EDUCATION/TRAINING PROGRAM

## 2019-07-18 PROCEDURE — 99239 HOSP IP/OBS DSCHRG MGMT >30: CPT | Mod: GC | Performed by: STUDENT IN AN ORGANIZED HEALTH CARE EDUCATION/TRAINING PROGRAM

## 2019-07-18 RX ADMIN — LACTULOSE 20 G: 20 SOLUTION ORAL at 07:59

## 2019-07-18 RX ADMIN — PANTOPRAZOLE SODIUM 40 MG: 40 TABLET, DELAYED RELEASE ORAL at 07:59

## 2019-07-18 ASSESSMENT — ACTIVITIES OF DAILY LIVING (ADL)
ADLS_ACUITY_SCORE: 12

## 2019-07-18 NOTE — PROGRESS NOTES
[facility], [place]    Progress Note - Antoinette 4 Service        Date of Admission:  7/15/2019    Assessment & Plan   Tree Andersen is a 28 year old man with a history of MDD who presented following acetaminophen overdose, found to have drug-induced liver injury, now with Gram positive bacteremia.    Changes today:  - Discontinued vancomycin  - Discontinued NAC gtt  - Stopped trending CMP, Mg, Phos, INR    # Acetaminophen overdose  # Acute liver injury  Took approximately 60 tablets of acetaminophen around 0300 on 7/13. Upon presentation at OSH: , , INR 1.2, acetaminophen level 50. Began mucomyst protocol and received 5mg IV vitamin K. ALT increased to >3500, AST to >2000, INR to 2.8, acetaminophen level <2 after 24 hours. Admission labs here at [facility] notable for elevated ammonia to 57, ALT 4879, AST 2441. INR 2.23, platelets 99. Lactic acid wnl at 1.7. Phos low at 1.3. Patient not encephalopathic at the time of admission here. LFTs downtrending. Discussed with [place] Poison Control. Acetaminophen level upon presentation at OSH: 50 (7/13 at 1650). CXR with no acute intrapulmonary pathology. RUQ U/S with heterogeneously coarsened hepatic echotexture and diffusely increased hepatic echogenicity, patent RUQ vasculature. UA with 10 ketones, small blood. Patient's LFTs continue to improve, discontinuing NAC.   - Hepatology consult, appreciate recs  - Discontinue NAC  - No Hepatology follow up necessary  - Q4H neuro-checks  - Stop trending labs Q8H  - Pantoprazole 40mg PO Qday (ulcer ppx)    # Staph epidermidis bacteremia, mecA gene negative: No obvious source. Afebrile, hemodynamically stable. Per ID, most likely due to skin contamination, no need for antibiotics or TTE.  - Discontinue vancomycin  - Daily surveillance cultures     # MDD  # Suicide attempt  # History of passive suicidal ideation  Diagnosed with MDD one year ago. Prescribed wellbutrin approximately 3 months ago, but  had stopped in the last few weeks (ran out of pills). Acetaminophen ingestion occurred during a period of acute alcohol intoxication. Endorses prior passive suicide ideation. Patient denies psychotherapy or medical therapy for depression, would rather try CBD oil. Patient is clearly ongoing threat to self, but declines inpatient psychiatric hospitalization, recognizes risks of not pursuing this, not committable per Psych.  - Psych consult   - Alcohol abstinence   - Lexapro 10 mg PO QAM, patient declined   - CBT, patient not interested  - Hold PTA wellbutrin for now given hepatic metabolism  - Given capacity and unwillingness to pursue treatment, will likely discharge to home in AM 7/18    Diet: Regular Diet  Fluids: None  DVT Prophylaxis: Mechanical  Johnston Catheter: Not present  Code Status: Full    Disposition Plan   Expected discharge: Tomorrow, recommended to prior living arrangement once mental status at baseline and liver labs show clear trend of improvement.  Entered: Roger Roman MD 07/17/2019, 9:10 PM     The patient was seen and discussed with Dr. Toney Klein, who agrees with the assessment and plan.    Roger Roman MD PhD  [address]  Pager: [phone]  Please see sticky note for cross cover information  ______________________________________________________________________    Interval History   No acute events overnight. This morning, patient has no complaints, notes his pain has improved. Continues declining any type of mental health medical therapy or psychotherapy. Clearly acknowledges risk of not pursuing therapy. Again states preference for CBD oil treatment. Denies fever, chills, nausea, vomiting, chest pain, shortness of breath.     Data reviewed today: I reviewed all medications, new labs and imaging results over the last 24 hours. I personally reviewed no images or EKG's today.    Physical Exam   Vital Signs: Temp: 97.9  F (36.6  C)  Temp src: Oral BP: 156/76 Pulse: 105 Heart Rate: 82 Resp: 16 SpO2: 96 % O2 Device: None (Room air)    Weight: 365 lbs 4.84 oz  General: Indifferent young man in NAD with very flat affect  HEENT: AT/NC, bilateral subconjunctival hemorrhage, anicteric sclerae, benign oropharynx, MMM  Neck: Supple  Cardiovascular: RRR, normal S1 S2, no m/r/g, 2+ peripheral pulses, trace/1+ BLE edema  Respiratory: LCTAB, no w/r/r, normal respiratory effort  Abdominal: Soft, distended due to habitus, nontender, normal bowel sounds, difficult to assess for hepatosplenomegaly given habitus  Musculoskeletal: Obese bulk, no obvious joint abnormalities  Neurologic: CN II-XII grossly intact, no focal deficits, AAOx4  Psych: Flat affect    Data   Recent Labs   Lab 07/17/19  1139 07/17/19  0421 07/16/19  1959  07/16/19  0733  07/15/19  2002   WBC  --  4.7  --   --  4.2  --  4.8   HGB  --  14.5  --   --  14.2  --  14.9   MCV  --  91  --   --  91  --  91   PLT  --  117*  --   --  101*  --  99*   INR 1.43* 1.36* 1.53*   < >  --    < > 2.23*    141 140   < >  --    < > 138   POTASSIUM 3.6 3.5 3.6   < >  --    < > 3.5   CHLORIDE 109 107 106   < >  --    < > 104   CO2 25 24 25   < >  --    < > 26   BUN 8 9 8   < >  --    < > 9   CR 0.78 0.82 0.84   < >  --    < > 0.78   ANIONGAP 6 9 9   < >  --    < > 8   TYRON 7.7* 7.9* 7.9*   < >  --    < > 7.7*   GLC 98 83 94   < >  --    < > 88   ALBUMIN 2.9* 3.0* 3.0*   < >  --    < > 3.0*   PROTTOTAL 5.7* 6.0* 6.0*   < >  --    < > 5.8*   BILITOTAL 2.2* 2.5* 2.6*   < >  --    < > 4.2*   ALKPHOS 66 72 74   < >  --    < > 75   ALT 2,118* 2,516* 2,899*   < >  --    < > 4,879*   * 558* 760*   < >  --    < > 2,441*    < > = values in this interval not displayed.     No results found for this or any previous visit (from the past 24 hour(s)).  Medications       lactulose  20 g Oral TID     pantoprazole  40 mg Oral QAM AC     sodium chloride (PF)  3 mL Intracatheter Q8H

## 2019-07-18 NOTE — PLAN OF CARE
Pt admitted for tylenol overdose. A&ox4. Nueros intact. Scleral hemmorhage & periorbital edema.  VSS on RA. Tele showing NSR. No c/o pain or nausea. Tolerating reg diet well w/ good appetite. Continued TID lactulose. 1 BM this shift. Voiding WDL. Phosphorus recheck this AM, replaced yesterday. Elevated LFT's. Both PIV's SL. Up independently in room. Able to make needs known. Slept well overnight. Pending AM lab results, possible discharge today. Will continue with POC.

## 2019-07-18 NOTE — PROGRESS NOTES
Social Work: Assessment with Discharge Plan    Patient Name:  Tree Andersen  :  1990  Age:  28 year old  MRN:  [id number]  Completed assessment with:  Patient    Presenting Information   Reason for Referral:  Mental illness  Date of Intake:  2019  Referral Source:  Chart Review  Decision Maker:  Patient  Alternate Decision Maker:  Per [facility] policy, pt's mother Ashley  Health Care Directive:  None on file  Living Situation:  House  Previous Functional Status:  Independent  Patient and family understanding of hospitalization:  Restorative  Cultural/Language/Spiritual Considerations:  None identified  Adjustment to Illness:  Pt quiet but willing to discuss his mental health; pt still not interested in community mental health services at this time    Physical Health  Reason for Admission:  No diagnosis found.  Services Needed/Recommended:  Home with no services    Mental Health/Chemical Dependency  Diagnosis:  Major depressive disorder, recent alcohol abuse  Support/Services in Place:  None  Services Needed/Recommended:  It is recommended pt establish with a therapist in the community. Pt declines this. Pt also declined starting Lexapro as recommended by psychiatry.    Support System  Significant relationship at present time:  None identified  Family of origin is available for support:  Yes, pt lives with his mother and states family is supportive  Other support available:  Pt states he has friends he can talk to and feels his friends know more than his family does  Gaps in support system:  Pt does not have formal mental health support in community  Patient is caregiver to:  Pt has a minor son; states he only sees him in the summer and on holidays    Provider Information   Primary Care Physician:  No Ref-Primary, Physician   None   Clinic:  No address on file      :  None    Financial   Income Source:  Pt on a leave from work currently  Financial Concerns:  None identified  Insurance:     Payor/Plan Subscriber Name Rel Member # Group #   [employer] - [employer] [place]  [id number]       [id number]       Discharge Plan   Patient and family discharge goal:  Home  Provided education on discharge plan:  YES  Patient agreeable to discharge plan:  YES, but not agreeable with mental health f/u  Barriers to discharge:  None identified    Discharge Recommendations   Anticipated Disposition:  Home, no needs identified  Transportation Needs:  Family:  Pt's mother  Name of Transportation Company and Phone:  NA    Additional comments   Pt is a 28-year-old male transferred from OSH following intentional acetaminophen overdose. Pt transferred to  from  and is stable for discharge today. Met with pt. Offered pt mental health resources in the community and pt politely declines this. Pt states he has built up walls over many years and is not yet ready to talk to somebody. Pt states he would like to restart his medication when able. Pt states he has social support who he can discuss these things with. Pt states he lives with his mother and she can transport him home at discharge. Psychiatry has spoken with pt's mother. Pt denies any suicidal ideation today. Made pt aware SW is available for questions or concerns prior to d/c.     PACHECO Castañeda, DONNASW  5th Floor   Pager [phone]  Phone [phone]

## 2019-07-18 NOTE — PLAN OF CARE
VSS except hypertensive on RA. A&Ox4. Neuros intact. Pt denies pain. Liver function tests trending down. Voiding independently. 1 BM this shift. Pt adequate for discharge. PIV removed. Discharge instructions went through with pt and family. Mental health referral made. No medications for discharge. Pt left unit at 1350.

## 2019-07-19 NOTE — PROGRESS NOTES
This number is not valid for patient.  There are no other numbers listed for specific patient. No discharge call will be made.

## 2019-07-21 LAB
BACTERIA SPEC CULT: NO GROWTH
Lab: NORMAL
SPECIMEN SOURCE: NORMAL

## 2019-07-22 NOTE — DISCHARGE SUMMARY
"[facility]  Discharge Summary - Medicine & Pediatrics       Date of Admission:  7/15/2019  Date of Discharge:  7/18/2019  1:53 PM  Discharging Provider: Roger Roman  Discharge Service: Antoinette 4    Discharge Diagnoses   Suicide attempt by acetaminophen overdose  Drug induced acute liver injury    Follow-ups Needed After Discharge   Follow-up Appointments     Adult [facility] Follow-up and recommended labs and tests      Follow up with your primary care provider, Physician No Ref-Primary,   within 7 days for hospital follow- up.  The following labs/tests are   recommended: CMP to evaluate LFTs.      Follow up with Mental Health for depression.     Appointments on [place] and/or [facility] (with [facility] or [facility]   provider or service). Call [phone] if you haven't heard regarding   these appointments within 7 days of discharge.             Unresulted Labs Ordered in the Past 30 Days of this Admission     Date and Time Order Name Status Description    7/16/2019 2200 Blood culture Preliminary           Discharge Disposition   Discharged to home  Condition at discharge: Stable    Hospital Course   From Dr. Jean Oliveira's H&P dated 7/15/2019:  \"Tree Andersen is a 28 year old male with past medical history significant for MDD who presents as a transfer from [facility] where he had been seen following an acetaminophen overdose. The patient reports consuming approximately 60 500mg tablets of acetaminophen around 3AM on Saturday morning 7/13. Prior to this, he had also consumed approximately five ~8oz glasses of vodka over the course of the evening and early morning. The patient said he was upset with himself after consuming the pills, and decided at that point to go to sleep. He awoke later that morning feeling nauseous and vomited for approximately two hours. He does not recall what the vomit looked like, but does not believe it contained blood. He also had " "diarrhea, which was not dark nor did it contain blood. He developed diffuse abdominal pain, which was worst in his right upper quadrant and in near his xiphoid process. The pain is worse with movement, and improves with rest. At this time, the pain is not present unless he moves. He has a dull headache that began yesterday but has improved throughout today. He denies feeling confused. He does feel fatigued. He denies having any fever or chills, chest pain, cough, or shortness of breath. He has not noticed any skin changes (yellow, bruising, or bleeding). He has not noticed any swelling in his legs.     The patient does not report any new, specific life stressors prior to the ingestion episode. He mentions that he was feeling down for a couple of days beforehand; he acknowledges that his mood fluctuates and he often experiences days at a time when he feels more depressed. He had recently (one year ago) been diagnosed with depression, and had started wellbutrin within the past few months. He acknowledges that the medication may have helped, but he had stopped taking it a few weeks ago after running out. Of note, he has been on medical leave from work for the past 3 months due to depression. Prior to the ingestion episode, he had been drinking vodka and playing video games. He recalls seeing the bottle of acetaminophen and impulsively decided to take the pills. He had not been planning to harm himself, and immediately felt regret after taking the pills. He expressed that he is \"very upset\" with himself and has many reasons to live, namely his seven year old son. He does note that he has had suicidal ideation in the past, but never with any intent nor plan to act. At present he continues to express not having any intention of harming himself, nor of harming others.      Upon presentation at OSH, he was found to have , , INR 1.2. He received mucomyst protocol and 5mg IV vitamin K. Over the course of 24 " "hours, acetaminophen level fell to <2, ALT had increased to >3500, AST to >2000, INR to 2.8. Admission labs here at [facility] notable for elevated ammonia to 57, ALT 4879, AST 2441. INR 2.23, platelets 99. Lactic acid wnl at 1.7. Phos low at 1.3. Patient was hemodynamically stable at time of admission.\"    The following problems were addressed during his hospitalization:    # Suicide attempt by acetaminophen overdose  # Acute liver injury  Took approximately 60 tablets of acetaminophen around 0300 on 7/13. Upon presentation at OSH: , , INR 1.2, acetaminophen level 50. Began mucomyst protocol and received 5mg IV vitamin K. ALT increased to >3500, AST to >2000, INR to 2.8, acetaminophen level <2 after 24 hours. Admission labs here at [facility] notable for elevated ammonia to 57, ALT 4879, AST 2441. INR 2.23, platelets 99. Lactic acid wnl at 1.7. Phos low at 1.3. Patient not encephalopathic at the time of admission here. LFTs downtrending. Discussed with [place] Poison Control. Acetaminophen level upon presentation at OSH: 50 (7/13 at 1650). CXR with no acute intrapulmonary pathology. RUQ U/S with heterogeneously coarsened hepatic echotexture and diffusely increased hepatic echogenicity, patent RUQ vasculature. UA with 10 ketones, small blood. Patient's LFTs continued to improve, discontinued NAC per protocol.   - No Hepatology follow up necessary  - Pantoprazole 40mg PO Qday (ulcer ppx)     # MDD  # Suicide attempt  # History of passive suicidal ideation  Diagnosed with MDD one year ago. Prescribed wellbutrin approximately 3 months ago, but had stopped in the last few weeks (ran out of pills). Acetaminophen ingestion occurred during a period of acute alcohol intoxication. Endorses prior passive suicide ideation. Patient denies medical therapy for depression, would rather try CBD oil. Patient is clearly ongoing threat to self, but declines inpatient psychiatric hospitalization, recognizes risks of not pursuing this, not " "committable per Psych. On day of discharge, patient was amenable to Psychology outpatient referral.   - Psychology referral  - Alcohol abstinence  - Would hold wellbutrin given drug-induced liver injury  - Given capacity and unwillingness to pursue treatment, discharged to home 7/18, though clearly patient is ongoing threat to himself    Consultations This Hospital Stay   GI HEPATOLOGY ADULT IP CONSULT  PHYSICAL THERAPY ADULT IP CONSULT  OCCUPATIONAL THERAPY ADULT IP CONSULT  VASCULAR ACCESS CARE ADULT IP CONSULT  SOCIAL WORK IP CONSULT  PSYCHIATRY IP CONSULT  VASCULAR ACCESS CARE ADULT IP CONSULT  PHARMACY TO DOSE VANCO  INFECTIOUS DISEASE GENERAL ADULT IP CONSULT    Code Status   Full Code     The patient was discussed with Dr. Toney Klein, who agrees with the assessment and plan.    Roger Roman MD PhD  [address], [place]  Pager: [phone]  ______________________________________________________________________    Physical Exam   BP (!) 164/94 (BP Location: Right arm)   Pulse 83   Temp 95.9  F (35.5  C) (Oral)   Resp 18   Ht 1.88 m (6' 2\")   Wt (!) 165.7 kg (365 lb 4.8 oz)   SpO2 95%   BMI 46.90 kg/m    General: Indifferent young man in NAD with very flat affect  HEENT: AT/NC, bilateral subconjunctival hemorrhage, anicteric sclerae, benign oropharynx, MMM  Neck: Supple  Cardiovascular: RRR, normal S1 S2, no m/r/g, 2+ peripheral pulses, trace/1+ BLE edema  Respiratory: LCTAB, no w/r/r, normal respiratory effort  Abdominal: Soft, distended due to habitus, nontender, normal bowel sounds, difficult to assess for hepatosplenomegaly given habitus  Musculoskeletal: Obese bulk, no obvious joint abnormalities  Neurologic: CN II-XII grossly intact, no focal deficits, AAOx4  Psych: Flat affect      Primary Care Physician   Physician No Ref-Primary    Discharge Orders      Psychology Referral      Reason for your hospital stay    You were hospitalized after a suicide " attempt with ingestion of acetaminophen, and you developed a drug-induced liver injury. Your liver labs improved subsequently with medication, and should continue to do so. You should follow up with Mental Health for therapy to address depression. You should also follow up with your PCP in 1-2 weeks to repeat liver tests, and depending on how these look, consider restarting Wellbutrin.     Adult [facility] Follow-up and recommended labs and tests    Follow up with your primary care provider, Physician No Ref-Primary, within 7 days for hospital follow- up.  The following labs/tests are recommended: CMP to evaluate LFTs.      Follow up with Mental Health for depression.     Appointments on [place] and/or [facility] (with [facility] or [facility] provider or service). Call [phone] if you haven't heard regarding these appointments within 7 days of discharge.     Activity    Your activity upon discharge: activity as tolerated     When to contact your care team    Call a nearby emergency department if you have any of the following: suicidal thoughts.     Full Code     Diet    Follow this diet upon discharge: Regular Diet Adult       Significant Results and Procedures   Most Recent 3 CBC's:  Recent Labs   Lab Test 07/18/19  0713 07/17/19  0421 07/16/19  0733   WBC 6.2 4.7 4.2   HGB 14.8 14.5 14.2   MCV 92 91 91    117* 101*     Most Recent 3 BMP's:  Recent Labs   Lab Test 07/18/19  0639 07/17/19  1139 07/17/19  0421    140 141   POTASSIUM 3.8 3.6 3.5   CHLORIDE 110* 109 107   CO2 24 25 24   BUN 8 8 9   CR 0.87 0.78 0.82   ANIONGAP 7 6 9   TYRON 8.2* 7.7* 7.9*   GLC 81 98 83     Most Recent 2 LFT's:  Recent Labs   Lab Test 07/18/19  0639 07/17/19  1139   * 411*   ALT 1,614* 2,118*   ALKPHOS 65 66   BILITOTAL 1.8* 2.2*     Most Recent 3 INR's:  Recent Labs   Lab Test 07/18/19  0639 07/17/19  1139 07/17/19  0421   INR 1.29* 1.43* 1.36*       Results for orders placed or performed during the hospital encounter  of 07/15/19   XR Chest Port 1 View    Narrative    EXAM: XR CHEST PORT 1 VW  7/15/2019 8:27 PM      HISTORY: evaluate for any new pulmonary infiltrate    COMPARISON: None.    FINDINGS: Semiupright AP radiograph of the chest. Lungs are  hypoinflated. The trachea is midline. The cardiac silhouette is within  normal limits. No pleural effusion or pneumothorax. No focal airspace  opacities. Upper abdomen is unremarkable.       Impression    IMPRESSION: No focal airspace opacities. Lungs are hypoinflated.    I have personally reviewed the examination and initial interpretation  and I agree with the findings.    MACARENA REILLY MD   US Abdomen Limited w Abd/Pelvis Duplex Complete    Narrative    EXAMINATION: US ABDOMEN COMPLETE WITH DOPPLER, 7/16/2019 10:31 AM     COMPARISON: None.    HISTORY: Acute liver injury with coagulopathy, no hepatic  encephalopathy, evaluate for vascular thrombosis    TECHNIQUE: The abdomen was scanned in standard fashion with  specialized ultrasound transducer(s) using both gray-scale, color  Doppler, and spectral flow techniques.    Findings:  Evaluation somewhat limited secondary to body habitus, overlying bowel  gas.    Liver: The liver demonstrates heterogeneously coarsened echotexture  and increased echogenicity. No evidence of a focal hepatic mass.     Extrahepatic portal vein flow is antegrade, measuring 23 cm/sec.  Right portal vein flow is antegrade, measuring 15 cm/sec.  Left portal vein flow is antegrade, measuring 8 cm/sec.    Flow in the hepatic artery is towards the liver and:  118 cm/sec peak systolic  0.68 resistive index.     Splenic vein is not seen. The left, middle, and right hepatic veins  are patent with flow towards the IVC. The IVC is patent with flow  towards the heart.   The visualized aorta is not dilated.    Gallbladder: There is no wall thickening, pericholecystic fluid,  positive sonographic Linares's sign or evidence for cholelithiasis    Bile Ducts: Both the intra-  and extrahepatic biliary system are of  normal caliber.  The common bile duct measures 5 mm in diameter.    Pancreas: Pancreas not visualized.    Kidney: The right kidney measures 12.0 cm long. There is no  hydronephrosis or hydroureter, no shadowing renal calculi, cystic  lesion or mass.       Impression    Impression:   1.  Heterogeneously coarsened hepatic echotexture and diffusely  increased hepatic echogenicity, likely representing intraparenchymal  liver disease such as hepatic steatosis.  2.  Patent right upper quadrant vasculature without evidence of  thrombosis. Splenic vein not visualized.    I have personally reviewed the examination and initial interpretation  and I agree with the findings.    MALORIE ASTUDILLO, DO       Discharge Medications   There are no discharge medications for this patient.    Allergies   No Known Allergies

## 2019-07-23 LAB
BACTERIA SPEC CULT: NO GROWTH
Lab: NORMAL
SPECIMEN SOURCE: NORMAL